# Patient Record
Sex: FEMALE | Race: WHITE | NOT HISPANIC OR LATINO | Employment: FULL TIME | URBAN - NONMETROPOLITAN AREA
[De-identification: names, ages, dates, MRNs, and addresses within clinical notes are randomized per-mention and may not be internally consistent; named-entity substitution may affect disease eponyms.]

---

## 2017-01-10 ENCOUNTER — HOSPITAL ENCOUNTER (OUTPATIENT)
Dept: OTHER | Facility: HOSPITAL | Age: 32
Discharge: HOME | End: 2017-01-10
Attending: ORTHOPAEDIC SURGERY | Admitting: ORTHOPAEDIC SURGERY

## 2017-01-30 RX ORDER — MELOXICAM 15 MG/1
TABLET ORAL
Qty: 30 TABLET | Refills: 0 | Status: SHIPPED | OUTPATIENT
Start: 2017-01-30 | End: 2018-04-20 | Stop reason: HOSPADM

## 2017-02-16 ENCOUNTER — OFFICE VISIT (OUTPATIENT)
Dept: ORTHOPEDIC SURGERY | Facility: CLINIC | Age: 32
End: 2017-02-16

## 2017-02-16 VITALS — BODY MASS INDEX: 25.46 KG/M2 | WEIGHT: 168 LBS | HEIGHT: 68 IN

## 2017-02-16 DIAGNOSIS — M23.91 INTERNAL DERANGEMENT OF KNEE, RIGHT: ICD-10-CM

## 2017-02-16 DIAGNOSIS — M22.41 CHONDROMALACIA PATELLAE, RIGHT: ICD-10-CM

## 2017-02-16 DIAGNOSIS — M25.561 RIGHT KNEE PAIN, UNSPECIFIED CHRONICITY: Primary | ICD-10-CM

## 2017-02-16 PROCEDURE — 99213 OFFICE O/P EST LOW 20 MIN: CPT | Performed by: ORTHOPAEDIC SURGERY

## 2017-02-16 PROCEDURE — 20610 DRAIN/INJ JOINT/BURSA W/O US: CPT | Performed by: ORTHOPAEDIC SURGERY

## 2017-02-16 RX ADMIN — TRIAMCINOLONE ACETONIDE 40 MG: 40 INJECTION, SUSPENSION INTRA-ARTICULAR; INTRAMUSCULAR at 12:02

## 2017-02-16 RX ADMIN — LIDOCAINE HYDROCHLORIDE 2 ML: 10 INJECTION, SOLUTION INFILTRATION; PERINEURAL at 12:02

## 2017-02-16 NOTE — PROGRESS NOTES
"Dayana Ramirez is a 31 y.o. female returns for     Chief Complaint   Patient presents with   • Right Knee - Follow-up, Results       HISTORY OF PRESENT ILLNESS:  Doing some better but still having some trouble with stairs and with walking down a slope.  Giving way.   Pain with pivot and twist.     CONCURRENT MEDICAL HISTORY:    No past medical history on file.    Allergies   Allergen Reactions   • Ceclor [Cefaclor]    • Erythromycin    • Penicillins    • Vancomycin          Current Outpatient Prescriptions:   •  meloxicam (MOBIC) 15 MG tablet, TAKE 1 TABLET BY MOUTH DAILY., Disp: 30 tablet, Rfl: 0    Past Surgical History   Procedure Laterality Date   • Ear tube removal     • Abdominal surgery     • Breast augmentation     •  section         ROS  No fevers or chills.  No chest pain or shortness of air.  No GI or  disturbances.    PHYSICAL EXAMINATION:       Visit Vitals   • Ht 68\" (172.7 cm)   • Wt 168 lb (76.2 kg)   • BMI 25.54 kg/m2       Physical Exam   Musculoskeletal:        Right knee: She exhibits no effusion.       GAIT:     [x]  Normal  []  Antalgic    Assistive device: [x]  None  []  Walker     []  Crutches  []  Cane     []  Wheelchair  []  Stretcher    Right Knee Exam     Tenderness   The patient is experiencing tenderness in the medial joint line.    Range of Motion   The patient has normal right knee ROM.    Muscle Strength     The patient has normal right knee strength.    Tests   Jordan:  Medial - positive   Lachman:  Anterior - negative      Drawer:       Anterior - 1+      Varus: negative  Valgus: negative    Other   Erythema: absent  Sensation: normal  Pulse: present  Swelling: none  Other tests: no effusion present                MRI of the right knee without contrast      HISTORY- Right knee pain      Multisequence multiplanar images of the right knee were obtained  without contrast.      COMPARISON- 2015.      Bone marrow edema or bone bruise patella, slightly more " pronounced  than prior study.  Chondromalacia patella inferior lateral aspect of the patella.  Very small joint effusion.  Cannot completely exclude partial tear of the anterior cruciate  ligament.  The posterior cruciate ligament, collateral ligaments, quadriceps  tendon and patellar tendon are intact.  Degenerative signal change in the menisci.  No meniscal tear.      CONCLUSION-  Bone marrow edema or bone bruise patella, slightly more pronounced  than prior study.  Chondromalacia patella inferior lateral aspect of the patella.  Very small joint effusion.  Cannot completely exclude partial tear of the anterior cruciate  ligament.      [id number]      Electronically Signed By- Kwan Redd MD On: 2017-01-10 11:53:19    Large Joint Arthrocentesis  Date/Time: 2/16/2017 12:02 PM  Consent given by: patient  Site marked: site marked  Timeout: Immediately prior to procedure a time out was called to verify the correct patient, procedure, equipment, support staff and site/side marked as required   Supporting Documentation  Indications: pain   Procedure Details  Location: knee - R knee  Preparation: Patient was prepped and draped in the usual sterile fashion  Needle size: 22 G  Approach: anteromedial  Medications administered: 40 mg triamcinolone acetonide 40 MG/ML; 2 mL lidocaine 1 %  Patient tolerance: patient tolerated the procedure well with no immediate complications              ASSESSMENT:    Diagnoses and all orders for this visit:    Right knee pain, unspecified chronicity  -     Large Joint Arthrocentesis  -     Ambulatory Referral to Physical Therapy Evaluate and treat    Chondromalacia patellae, right  -     Large Joint Arthrocentesis  -     Ambulatory Referral to Physical Therapy Evaluate and treat    Internal derangement of knee, right  -     Large Joint Arthrocentesis  -     Ambulatory Referral to Physical Therapy Evaluate and treat          PLAN    No definitive internal derangement on MRI.  Somewhat symptomatic  with ACL.  Discussed continued use of NSAIDS.  Also discussed PT with HEP to see if strengthening the knee will help alleviate symptoms.  We also discussed KT-1000 test to assess the integrity and function of the acl.  F/u after PT and KT-1000.    Return in about 6 weeks (around 3/30/2017) for recheck.    Paco Perez MD

## 2017-02-19 RX ORDER — LIDOCAINE HYDROCHLORIDE 10 MG/ML
2 INJECTION, SOLUTION INFILTRATION; PERINEURAL
Status: COMPLETED | OUTPATIENT
Start: 2017-02-16 | End: 2017-02-16

## 2017-02-19 RX ORDER — TRIAMCINOLONE ACETONIDE 40 MG/ML
40 INJECTION, SUSPENSION INTRA-ARTICULAR; INTRAMUSCULAR
Status: COMPLETED | OUTPATIENT
Start: 2017-02-16 | End: 2017-02-16

## 2017-02-28 ENCOUNTER — HOSPITAL ENCOUNTER (OUTPATIENT)
Dept: PHYSICAL THERAPY | Facility: HOSPITAL | Age: 32
Setting detail: THERAPIES SERIES
Discharge: HOME | End: 2017-02-28

## 2017-02-28 DIAGNOSIS — M25.561 RIGHT KNEE PAIN, UNSPECIFIED CHRONICITY: Primary | ICD-10-CM

## 2017-02-28 PROCEDURE — 97161 PT EVAL LOW COMPLEX 20 MIN: CPT | Performed by: PHYSICAL THERAPIST

## 2017-02-28 PROCEDURE — 97110 THERAPEUTIC EXERCISES: CPT | Performed by: PHYSICAL THERAPIST

## 2017-03-09 ENCOUNTER — APPOINTMENT (OUTPATIENT)
Dept: PHYSICAL THERAPY | Facility: HOSPITAL | Age: 32
End: 2017-03-09

## 2017-06-26 ENCOUNTER — DOCUMENTATION (OUTPATIENT)
Dept: PHYSICAL THERAPY | Facility: HOSPITAL | Age: 32
End: 2017-06-26

## 2017-09-26 ENCOUNTER — TRANSCRIBE ORDERS (OUTPATIENT)
Dept: PHYSICAL THERAPY | Facility: HOSPITAL | Age: 32
End: 2017-09-26

## 2017-10-02 ENCOUNTER — TRANSCRIBE ORDERS (OUTPATIENT)
Dept: PHYSICAL THERAPY | Facility: HOSPITAL | Age: 32
End: 2017-10-02

## 2017-10-02 ENCOUNTER — HOSPITAL ENCOUNTER (OUTPATIENT)
Dept: PHYSICAL THERAPY | Facility: HOSPITAL | Age: 32
Setting detail: THERAPIES SERIES
Discharge: HOME | End: 2017-10-02

## 2017-10-02 DIAGNOSIS — M25.562 ACUTE PAIN OF LEFT KNEE: Primary | ICD-10-CM

## 2017-10-02 DIAGNOSIS — Z98.890 STATUS POST ARTHROSCOPY OF LEFT KNEE: Primary | ICD-10-CM

## 2017-10-02 PROCEDURE — 97162 PT EVAL MOD COMPLEX 30 MIN: CPT | Performed by: PHYSICAL THERAPIST

## 2017-10-02 PROCEDURE — G0283 ELEC STIM OTHER THAN WOUND: HCPCS | Performed by: PHYSICAL THERAPIST

## 2017-10-02 PROCEDURE — 97110 THERAPEUTIC EXERCISES: CPT | Performed by: PHYSICAL THERAPIST

## 2017-10-03 NOTE — THERAPY EVALUATION
"    Outpatient Physical Therapy Ortho Initial Evaluation  [facility]     Patient Name: Dayana Ramirez  : 1985  MRN: [id number]  Today's Date: 10/2/2017      Visit Date: 10/02/2017  Attendance:  (24 visits)  Subjective Improvement: n/a  Next MD Appt: 1-2 weeks  Recert Date: 10/23/17    Therapy Diagnosis: s/p L knee lateral release 17    Patient Active Problem List   Diagnosis   • Right knee pain   • Chondromalacia patellae   • Internal derangement of knee        History reviewed. No pertinent past medical history.     Past Surgical History:   Procedure Laterality Date   • ABDOMINAL SURGERY     • BREAST AUGMENTATION     •  SECTION     • EAR TUBE REMOVAL     • KNEE ARTHROSCOPY W/ LATERAL RELEASE Left 2017       Visit Dx:     ICD-10-CM ICD-9-CM   1. Status post arthroscopy of left knee Z98.890 V45.89             Patient History       10/02/17 1400          History    Chief Complaint Pain;Difficulty Walking;Difficulty with daily activities  -SS      Type of Pain Knee pain  -SS      Date Current Problem(s) Began 17   chronic before surgery  -SS      Brief Description of Current Complaint Patient underwent L knee arthroscopy with lateral release on 17 for chronic knee pain. Knee hurts when she bends it. Returned to work today. Missed 3 days of work. Has been sitting, but not able to really elevate her leg today since RTW.  female with children. Lives in a single story house with 4 steps to enter.   -SS      Patient/Caregiver Goals Relieve pain   \"Be able to move my leg.\" Decrease swelling.  -SS      Current Tobacco Use no  -SS      Smoking Status none  -SS      Patient's Rating of General Health Excellent  -SS      Occupation/sports/leisure activities [employer] - . Hobbies: sports  -SS      Surgery Date 1 17  -SS      Pain     Pain Location Knee   left  -SS      Pain at Present 4;5  -SS      Pain at Best 3   since " surgery  -SS      Pain at Worst 7;8   since surgery  -SS      Pain Frequency Constant/continuous  -SS      Pain Description Sharp;Tightness   tightness laterally  -SS      What Performance Factors Make the Current Problem(s) WORSE? steps, walking, standing, bending the knee  -SS      What Performance Factors Make the Current Problem(s) BETTER? ice  -SS      Is your sleep disturbed? Yes  -SS      Difficulties at work? pain  -SS      Difficulties with ADL's? decreased activity level  -SS      Difficulties with recreational activities? decreased activity level  -SS      Fall Risk Assessment    Any falls in the past year: No  -SS      Does patient have a fear of falling No  -SS      Daily Activities    Primary Language English  -SS      Safety    Are you being hurt, hit, or frightened by anyone at home or in your life? No  -SS      Are you being neglected by a caregiver No  -SS        User Key  (r) = Recorded By, (t) = Taken By, (c) = Cosigned By    Initials Name Provider Type    SS Kwan Garrett, PT DPT Physical Therapist                PT Ortho       10/02/17 1400    Subjective Comments    Subjective Comments see Therapy Patient History  -SS    Precautions and Contraindications    Precautions/Limitations other (see comments)   limit abduction SLR for at least 3 weeks  -SS    Precautions 5 days post-op  -SS    Contraindications none  -SS    Subjective Pain    Able to rate subjective pain? yes  -SS    Pre-Treatment Pain Level 5  -SS    Post-Treatment Pain Level 0  -SS    Posture/Observations    Observations Edema   L knee  -SS    Posture/Observations Comments Antalgic gait guarding her L knee; no assistive device. Sutured arthroscopy portals medial and lateral to the left patella  -SS    Special Tests/Palpation    Special Tests/Palpation --   Increase tone and TTP lateral L thigh. TTP lateral L knee  -SS    Left Knee    Extension/Flexion AROM Deficit 0-3-57   pain lateral knee and thigh  -SS    Right Knee     Extension/Flexion AROM Deficit 5-0-135  -SS    Lower Extremity    Lower Ext Manual Muscle Testing Detail 4 degree extension lag with SLR on left  -SS      User Key  (r) = Recorded By, (t) = Taken By, (c) = Cosigned By    Initials Name Provider Type     Kwan Garrett, PT DPT Physical Therapist                            Therapy Education       10/02/17 1400          Therapy Education    Given HEP   QS w/ adductor squeeze, SAQ  -SS      Program New  -SS      How Provided Verbal;Demonstration;Written  -SS      Provided to Patient  -      Level of Understanding Verbalized;Demonstrated  -        User Key  (r) = Recorded By, (t) = Taken By, (c) = Cosigned By    Initials Name Provider Type     Kwan Garrett, PT DPT Physical Therapist                PT OP Goals       10/02/17 1400       PT Short Term Goals    STG Date to Achieve 10/23/17  -SS     STG 1 Note a >/= 50% subjective improvement  -     STG 2 L knee active extension to 0 degrees  -     STG 3 L knee active flexion to >/= 100 degrees  -     STG 4 SLR without extension lag  -     STG 5 LEFS score to be >/= 30/80  -     Long Term Goals    LTG Date to Achieve 11/13/17  -SS     LTG 1 Independent with HEP  -SS     LTG 2 L knee active flexion to >/= 130 degrees  -     LTG 3 L rectus femoris, hip, and knee strength to be 5/5  -SS     LTG 4 Minimally antalgic gait.  -     LTG 5 Demo ability to ascend stairs reciprocally  -     LTG 6 LEFS score to be >/= 50/80  -     Time Calculation    PT Goal Re-Cert Due Date 10/23/17  -       User Key  (r) = Recorded By, (t) = Taken By, (c) = Cosigned By    Initials Name Provider Type     Kwan Garrett, PT DPT Physical Therapist                PT Assessment/Plan       10/02/17 1400       PT Assessment    Functional Limitations Impaired gait;Limitation in home management;Performance in leisure activities;Performance in work activities  -     Impairments Pain;Range of motion;Muscle  strength;Gait  -     Assessment Comments Patient is 5 days s/p lateral release of L knee. I recommended that she continue with crutches until her pain decreases and her gait improves.  -     Rehab Potential Excellent  -SS     Patient/caregiver participated in establishment of treatment plan and goals Yes  -SS     Patient would benefit from skilled therapy intervention Yes  -SS     PT Plan    PT Frequency 2x/week;1x/week  -SS     Predicted Duration of Therapy Intervention (days/wks) 4-6 weeks  -SS     Planned CPT's? PT EVAL MOD COMPLEXITY: 31449;PT THER PROC EA 15 MIN: 27941;PT MANUAL THERAPY EA 15 MIN: 32184;PT HOT OR COLD PACK TREAT MCARE;PT ELECTRICAL STIM UNATTEND: ;PT THER SUPP EA 15 MIN  -     Physical Therapy Interventions (Optional Details) gait training;home exercise program;modalities;neuromuscular re-education;patient/family education;ROM (Range of Motion);strengthening;stretching  -     PT Plan Comments ROM, stretching, strengthening, gait training, IFC estim with ice for pain, Russian stim to L quads if quad function not returning after 1 week of P.T.  -       User Key  (r) = Recorded By, (t) = Taken By, (c) = Cosigned By    Initials Name Provider Type     Kwan Garrett, PT DPT Physical Therapist                Modalities       10/02/17 1400          Ice    Ice Applied Yes  -SS      Location L knee    supine with L LE elevated  -SS      Rx Minutes Other:   20 mins; concurrent with IFC  -SS      Ice Prior to Rx No  -SS      Ice S/P Rx Yes  -SS      ELECTRICAL STIMULATION    Attended/Unattended Unattended  -      Stimulation Type IFC  -SS      Location/Electrode Placement/Other L knee  -SS      Rx Minutes 20 mins   concurrent with ice  -SS        User Key  (r) = Recorded By, (t) = Taken By, (c) = Cosigned By    Initials Name Provider Type     Kwan Garrett, PT DPT Physical Therapist              Exercises       10/02/17 1400          Subjective Comments    Subjective  Comments see Therapy Patient History  -SS      Subjective Pain    Able to rate subjective pain? yes  -SS      Pre-Treatment Pain Level 5  -SS      Post-Treatment Pain Level 0  -SS      Aquatics    Aquatics performed? No  -SS      Exercise 1    Exercise Name 1 Pro2, Seat 12, ROM  -SS      Cueing 1 Verbal  -SS      Time (Minutes) 1 8 min  -SS      Exercise 2    Exercise Name 2 Incline calf stretch  -SS      Cueing 2 Verbal;Demo  -SS      Reps 2 3  -SS      Time (Seconds) 2 30 sec hold  -SS      Exercise 3    Exercise Name 3 Standing HS stretch  -SS      Cueing 3 Verbal;Demo  -SS      Reps 3 3  -SS      Time (Seconds) 3 30 sec hold  -SS      Exercise 4    Exercise Name 4 Quad set with isometric adduction  -SS      Cueing 4 Verbal  -SS      Sets 4 1  -SS      Reps 4 10  -SS      Time (Seconds) 4 5 sec hold  -SS      Exercise 5    Exercise Name 5 SAQ  -SS      Cueing 5 Verbal  -SS      Sets 5 1  -SS      Reps 5 10  -SS      Exercise 6    Exercise Name 6 --  -SS        User Key  (r) = Recorded By, (t) = Taken By, (c) = Cosigned By    Initials Name Provider Type    SS Kwan Garrett, PT DPT Physical Therapist                              Outcome Measures       10/02/17 1400          Lower Extremity Functional Index    Any of your usual work, housework or school activities 0  -SS      Your usual hobbies, recreational or sporting activities 0  -SS      Getting into or out of the bath 0  -SS      Walking between rooms 0  -SS      Putting on your shoes or socks 0  -SS      Squatting 0  -SS      Lifting an object, like a bag of groceries from the floor 0  -SS      Performing light activities around your home 0  -SS      Performing heavy activities around your home 0  -SS      Getting into or out of a car 0  -SS      Walking 2 blocks 0  -SS      Walking a mile 0  -SS      Going up or down 10 stairs (about 1 flight of stairs) 0  -SS      Standing for 1 hour 0  -SS      Sitting for 1 hour 0  -SS      Running on even ground  0  -SS      Running on uneven ground 0  -SS      Making sharp turns while running fast 0  -SS      Hopping 0  -SS      Rolling over in bed 0  -SS      Total 0  -SS      Functional Assessment    Outcome Measure Options Lower Extremity Functional Scale (LEFS)  -SS        User Key  (r) = Recorded By, (t) = Taken By, (c) = Cosigned By    Initials Name Provider Type    SS Kwan Garrett, PT DPT Physical Therapist            Time Calculation:   Start Time: 1400  Stop Time: 1514  Time Calculation (min): 74 min     Therapy Charges for Today     Code Description Service Date Service Provider Modifiers Qty    36912897311 HC PT THER PROC EA 15 MIN 10/2/2017 Kwan Garrett, PT DPT GP 2    55160994012 HC PT ELECTRICAL STIM UNATTENDED 10/2/2017 Kwan Garrett, PT DPT  1    11980293961 HC PT THER SUPP EA 15 MIN 10/2/2017 Kwan Garrett, PT DPT GP 1    11427770339 HC PT EVAL MOD COMPLEXITY 2 10/2/2017 Kwan Garrett, PT DPT GP 1                   Kwan Garrett, PT, DPT, CHT  10/2/2017

## 2017-10-06 ENCOUNTER — HOSPITAL ENCOUNTER (OUTPATIENT)
Dept: PHYSICAL THERAPY | Facility: HOSPITAL | Age: 32
Setting detail: THERAPIES SERIES
Discharge: HOME | End: 2017-10-06

## 2017-10-06 DIAGNOSIS — Z98.890 STATUS POST ARTHROSCOPY OF LEFT KNEE: Primary | ICD-10-CM

## 2017-10-06 PROCEDURE — G0283 ELEC STIM OTHER THAN WOUND: HCPCS

## 2017-10-06 PROCEDURE — 97110 THERAPEUTIC EXERCISES: CPT

## 2017-10-06 NOTE — THERAPY TREATMENT NOTE
Outpatient Physical Therapy Ortho Treatment Note  [facility]     Patient Name: Dayana Ramirez  : 1985  MRN: [id number]  Today's Date: 10/6/2017      Visit Date: 10/06/2017     Sub imp 0%  Visit 2/2 (24)  MD 10/10/17  Re 10/23/17    Visit Dx:    ICD-10-CM ICD-9-CM   1. Status post arthroscopy of left knee Z98.890 V45.89       Patient Active Problem List   Diagnosis   • Right knee pain   • Chondromalacia patellae   • Internal derangement of knee        No past medical history on file.     Past Surgical History:   Procedure Laterality Date   • ABDOMINAL SURGERY     • BREAST AUGMENTATION     •  SECTION     • EAR TUBE REMOVAL     • KNEE ARTHROSCOPY W/ LATERAL RELEASE Left 2017             PT Ortho       10/06/17 0800    Left Knee    Extension/Flexion AROM Deficit (P)  0-90  -    Lower Extremity    Lower Ext Manual Muscle Testing Detail (P)  8 degree ext lag with Ind. SLR  -STEPHANY    Gait Assessment/Treatment    Gait, Comment (P)  PWB with 2 crutches  -      User Key  (r) = Recorded By, (t) = Taken By, (c) = Cosigned By    Initials Name Provider Type    STEPHANY Amador ATC                             PT Assessment/Plan       10/06/17 0841       PT Assessment    Assessment Comments (P)  Limited rx to table today secondary to increased swelling. MD note sent with patient. Applied Tubigrip for compression  -     PT Plan    PT Plan Comments (P)  Cont per POC, ROM, Strength, Gait, Edema control  -       User Key  (r) = Recorded By, (t) = Taken By, (c) = Cosigned By    Initials Name Provider Type    STEPHANY Amador ATC                 Modalities       10/06/17 0800          Ice    Ice Applied (P)  Yes  -STEPHANY      Location (P)  L knee    supine with L LE elevated  -STEPHANY      Rx Minutes (P)  Other:   20 mins; concurrent with IFC  -STEPHANY      Ice Prior to Rx (P)  No  -STEPHANY      Ice S/P Rx (P)  Yes  -STEPHANY      ELECTRICAL STIMULATION    Attended/Unattended (P)   Unattended  -STEPHANY      Stimulation Type (P)  IFC  -STEPHANY      Location/Electrode Placement/Other (P)  L knee  -STEPHANY      Rx Minutes (P)  20 mins   concurrent with ice  -STEPHANY        User Key  (r) = Recorded By, (t) = Taken By, (c) = Cosigned By    Initials Name Provider Type    STEPHANY Amador ATC                 Exercises       10/06/17 0800          Subjective Comments    Subjective Comments (P)  Reports increased swelling today. Has been trying to elevate more  -STEPHANY      Subjective Pain    Able to rate subjective pain? (P)  yes  -STEPHANY      Pre-Treatment Pain Level (P)  6  -STEPHANY      Post-Treatment Pain Level (P)  6  -STEPHANY      Aquatics    Aquatics performed? (P)  No  -STEPHANY      Exercise 1    Exercise Name 1 (P)  QS  -STEPHANY      Sets 1 (P)  2  -STEPHANY      Reps 1 (P)  10  -STEPHANY      Exercise 2    Exercise Name 2 (P)  SLR Flex  -STEPHANY      Sets 2 (P)  2  -STEPHANY      Reps 2 (P)  10  -STEPHANY      Exercise 3    Exercise Name 3 (P)  Heel slides w/strap  -STEPHANY      Sets 3 (P)  2  -STEPHANY      Reps 3 (P)  10  -STEPHANY      Exercise 4    Exercise Name 4 (P)  SLR Hip Add  -STEPHANY      Sets 4 (P)  2  -STEPHANY      Reps 4 (P)  10  -STEPHANY      Exercise 5    Exercise Name 5 (P)  SLR Hip Ext  -STEPHANY      Sets 5 (P)  2  -STEPHANY      Reps 5 (P)  10  -STEPHANY      Exercise 6    Exercise Name 6 (P)  SAQ  -STEPHANY      Sets 6 (P)  2  -STEPHANY      Reps 6 (P)  10  -STEPHANY        User Key  (r) = Recorded By, (t) = Taken By, (c) = Cosigned By    Initials Name Provider Type    STEPHANY Amador ATC                                PT OP Goals       10/06/17 0800       PT Short Term Goals    STG Date to Achieve (P)  10/23/17  -STEPHANY     STG 1 (P)  Note a >/= 50% subjective improvement  -STEPHANY     STG 1 Progress (P)  Not Met  -STEPHNAY     STG 2 (P)  L knee active extension to 0 degrees  -STEPHANY     STG 2 Progress (P)  Met  -STEPHANY     STG 3 (P)  L knee active flexion to >/= 100 degrees  -STEPHANY     STG 3 Progress (P)  Not Met  -STEPHANY     STG 4 (P)  SLR without extension lag  -STEPHANY     STG 4 Progress (P)  Not Met  -STEPHANY      STG 5 (P)  LEFS score to be >/= 30/80  -STEPHANY     Long Term Goals    LTG Date to Achieve (P)  11/13/17  -STEPHANY     LTG 1 (P)  Independent with HEP  -STEPHANY     LTG 2 (P)  L knee active flexion to >/= 130 degrees  -STEPHANY     LTG 3 (P)  L rectus femoris, hip, and knee strength to be 5/5  -STEPHANY     LTG 4 (P)  Minimally antalgic gait.  -STEPHANY     LTG 5 (P)  Demo ability to ascend stairs reciprocally  -STEPHANY     LTG 6 (P)  LEFS score to be >/= 50/80  -STEPHANY       User Key  (r) = Recorded By, (t) = Taken By, (c) = Cosigned By    Initials Name Provider Type    STEPHANY Amador ATC                 Therapy Education       10/06/17 0810          Therapy Education    Given (P)  HEP  -STEPHANY      Program (P)  Reinforced  -STEPHANY      How Provided (P)  Verbal  -STEPHANY      Provided to (P)  Patient  -STEPHANY      Level of Understanding (P)  Verbalized  -STEPHANY        User Key  (r) = Recorded By, (t) = Taken By, (c) = Cosigned By    Initials Name Provider Type    STEPHANY Amador ATC                 Time Calculation:   Start Time: (P) 0808  Stop Time: (P) 0902  Time Calculation (min): (P) 54 min  Total Timed Code Minutes- PT: (P) 34 minute(s)    Therapy Charges for Today     Code Description Service Date Service Provider Modifiers Qty    46754275532 HC PT THER SUPP EA 15 MIN 10/6/2017 James Amador ATC  1    71847839166 HC PT ELECTRICAL STIM UNATTENDED 10/6/2017 James Amador ATC  1    09823382269 HC PT THER PROC EA 15 MIN 10/6/2017 James Amador ATC  2                    James Amador ATC  10/6/2017

## 2017-10-10 ENCOUNTER — HOSPITAL ENCOUNTER (OUTPATIENT)
Dept: PHYSICAL THERAPY | Facility: HOSPITAL | Age: 32
Setting detail: THERAPIES SERIES
Discharge: HOME | End: 2017-10-10

## 2017-10-10 DIAGNOSIS — Z98.890 STATUS POST ARTHROSCOPY OF LEFT KNEE: Primary | ICD-10-CM

## 2017-10-10 PROCEDURE — G0283 ELEC STIM OTHER THAN WOUND: HCPCS

## 2017-10-10 PROCEDURE — 97110 THERAPEUTIC EXERCISES: CPT

## 2017-10-10 NOTE — THERAPY TREATMENT NOTE
Outpatient Physical Therapy Ortho Treatment Note  [facility]     Patient Name: Dayana Ramirez  : 1985  MRN: [id number]  Today's Date: 10/10/2017      Visit Date: 10/10/2017  Subjective Improvement: 20%  Visit Number:   3/3  Recert Date:   10/23/17  MD Visit:   2017  Total Approved Visits:  24 for year    Visit Dx:    ICD-10-CM ICD-9-CM   1. Status post arthroscopy of left knee Z98.890 V45.89       Patient Active Problem List   Diagnosis   • Right knee pain   • Chondromalacia patellae   • Internal derangement of knee        No past medical history on file.     Past Surgical History:   Procedure Laterality Date   • ABDOMINAL SURGERY     • BREAST AUGMENTATION     •  SECTION     • EAR TUBE REMOVAL     • KNEE ARTHROSCOPY W/ LATERAL RELEASE Left 2017             PT Ortho       10/10/17 1700    Precautions and Contraindications    Precautions/Limitations --   Limit ABD SLR for at least 3 weeks.  -BB    Contraindications none  -BB    Subjective Pain    Pre-Treatment Pain Level 4  -BB    Posture/Observations    Posture/Observations Comments Mod edema noted.  Sutures removed.   -BB    Special Tests/Palpation    Special Tests/Palpation --   palpable knot lat knee  -BB    General LE Assessment    ROM --   AROM Ext 0, AROM flex 95 deg  -BB    Lower Extremity    Lower Ext Manual Muscle Testing Detail Poor quad activation.   -BB    Gait Assessment/Treatment    Gait, Comment Gait antalgic without crutches and decreased knee flexion.   -BB      User Key  (r) = Recorded By, (t) = Taken By, (c) = Cosigned By    Initials Name Provider Type    BB Tiera Danielle PTA Physical Therapy Assistant                            PT Assessment/Plan       10/10/17 1817       PT Assessment    Assessment Comments Added [place] today per POC due to weak quad.  Encoraged HEP compliance and non antalgic gait.    -BB     PT Plan    PT Frequency 2x/week  -BB     Predicted Duration of Therapy Intervention  (days/wks) 4-6 weeks  -BB     PT Plan Comments Continue with gait, edema control, ROM, Strength and modalities as needed.   -BB       User Key  (r) = Recorded By, (t) = Taken By, (c) = Cosigned By    Initials Name Provider Type    PHAN Danielle PTA Physical Therapy Assistant                Modalities       10/10/17 1700          Ice    Ice Applied Yes  -BB      Location L knee    supine with L LE elevated  -BB      Rx Minutes Other:   20 mins; concurrent with IFC  -BB      Ice Prior to Rx No  -BB      Ice S/P Rx Yes  -BB      ELECTRICAL STIMULATION    Attended/Unattended Unattended  -BB      Stimulation Type IFC  -BB      Location/Electrode Placement/Other L knee  -BB      Rx Minutes 20 mins   concurrent with ice  -BB        User Key  (r) = Recorded By, (t) = Taken By, (c) = Cosigned By    Initials Name Provider Type    PHAN Danielle PTA Physical Therapy Assistant                Exercises       10/10/17 1700          Subjective Comments    Subjective Comments Saw the doctor today and they said everything looked ok and normal.  States they were ok with her going without crutches.   -BB      Subjective Pain    Able to rate subjective pain? yes  -BB      Pre-Treatment Pain Level 4  -BB      Post-Treatment Pain Level 5  -BB      Aquatics    Aquatics performed? No  -BB      Exercise 1    Exercise Name 1 Pro II Rocking  to circles  -BB      Time (Minutes) 1 10  -BB      Exercise 2    Exercise Name 2 incline stretch  -BB      Reps 2 3  -BB      Time (Seconds) 2 30  -BB      Exercise 3    Exercise Name 3 HS stretch  -BB      Reps 3 3  -BB      Time (Seconds) 3 30  -BB      Exercise 4    Exercise Name 4 [place] Estim L VMO with QS  -BB      Time (Minutes) 4 10  -BB      Additional Comments 10/10 with 1.0 ruby  -BB      Exercise 5    Exercise Name 5 Heel slides with strap  -BB      Reps 5 10  -BB      Time (Seconds) 5 5  -BB        User Key  (r) = Recorded By, (t) = Taken By, (c) = Cosigned By    Initials Name  Provider Type    PHAN Danielle PTA Physical Therapy Assistant                               PT OP Goals       10/10/17 1700       PT Short Term Goals    STG Date to Achieve 10/23/17  -BB     STG 1 Note a >/= 50% subjective improvement  -BB     STG 1 Progress Not Met  -BB     STG 2 L knee active extension to 0 degrees  -BB     STG 2 Progress Met  -BB     STG 3 L knee active flexion to >/= 100 degrees  -BB     STG 3 Progress Progressing  -BB     STG 4 SLR without extension lag  -BB     STG 4 Progress Not Met  -BB     STG 5 LEFS score to be >/= 30/80  -BB     STG 5 Progress Not Met  -BB     Long Term Goals    LTG Date to Achieve 11/13/17  -BB     LTG 1 Independent with HEP  -BB     LTG 1 Progress Not Met  -BB     LTG 2 L knee active flexion to >/= 130 degrees  -BB     LTG 2 Progress Not Met  -BB     LTG 3 L rectus femoris, hip, and knee strength to be 5/5  -BB     LTG 3 Progress Not Met  -BB     LTG 4 Minimally antalgic gait.  -BB     LTG 4 Progress Not Met  -BB     LTG 5 Demo ability to ascend stairs reciprocally  -BB     LTG 5 Progress Not Met  -BB     LTG 6 LEFS score to be >/= 50/80  -BB     LTG 6 Progress Not Met  -BB     Time Calculation    PT Goal Re-Cert Due Date 10/23/17  -BB       User Key  (r) = Recorded By, (t) = Taken By, (c) = Cosigned By    Initials Name Provider Type    PHAN Danielle PTA Physical Therapy Assistant                Therapy Education       10/10/17 1814          Therapy Education    Given HEP;Symptoms/condition management;Edema management;Other (comment)   Encouraged to focus on gait without limp and edu on gait with 1 crutch.   -BB      Program New  -BB      How Provided Verbal;Demonstration;Written  -BB      Provided to Patient  -BB      Level of Understanding Verbalized;Demonstrated  -BB        User Key  (r) = Recorded By, (t) = Taken By, (c) = Cosigned By    Initials Name Provider Type    PHAN Danielle PTA Physical Therapy Assistant                Time Calculation:    Start Time: 1735  Stop Time: 1845  Time Calculation (min): 70 min  Total Timed Code Minutes- PT: 70 minute(s)    Therapy Charges for Today     Code Description Service Date Service Provider Modifiers Qty    55208822859 HC PT ELECTRICAL STIM UNATTENDED 10/10/2017 Tiera Danielle, PTA  1    84879176460 HC PT THER PROC EA 15 MIN 10/10/2017 Tiera Danielle PTA GP 3                    Tiera Danielle PTA  10/10/2017

## 2017-10-12 ENCOUNTER — HOSPITAL ENCOUNTER (OUTPATIENT)
Dept: PHYSICAL THERAPY | Facility: HOSPITAL | Age: 32
Setting detail: THERAPIES SERIES
Discharge: HOME | End: 2017-10-12

## 2017-10-12 DIAGNOSIS — Z98.890 STATUS POST ARTHROSCOPY OF LEFT KNEE: Primary | ICD-10-CM

## 2017-10-12 PROCEDURE — 97110 THERAPEUTIC EXERCISES: CPT

## 2017-10-12 PROCEDURE — G0283 ELEC STIM OTHER THAN WOUND: HCPCS

## 2017-10-12 NOTE — THERAPY TREATMENT NOTE
Outpatient Physical Therapy Ortho Treatment Note  [facility]     Patient Name: Dayana Ramirez  : 1985  MRN: [id number]  Today's Date: 10/12/2017      Visit Date: 10/12/2017     Sub imp 20%  Visit / (24)  RE 10/23/17  MD 17    Visit Dx:    ICD-10-CM ICD-9-CM   1. Status post arthroscopy of left knee Z98.890 V45.89       Patient Active Problem List   Diagnosis   • Right knee pain   • Chondromalacia patellae   • Internal derangement of knee        No past medical history on file.     Past Surgical History:   Procedure Laterality Date   • ABDOMINAL SURGERY     • BREAST AUGMENTATION     •  SECTION     • EAR TUBE REMOVAL     • KNEE ARTHROSCOPY W/ LATERAL RELEASE Left 2017             PT Ortho       10/12/17 0900    General LE Assessment    ROM Detail (P)  0-100  -      10/12/17 0800    Posture/Observations    Posture/Observations Comments (P)  Cont mod. edemal  -    Gait Assessment/Treatment    Gait, Comment (P)  PWB with 2 crutches  -      10/10/17 1700    Precautions and Contraindications    Precautions/Limitations --   Limit ABD SLR for at least 3 weeks.  -BB    Contraindications none  -BB    Subjective Pain    Pre-Treatment Pain Level 4  -    Posture/Observations    Posture/Observations Comments Mod edema noted.  Sutures removed.   -BB    Special Tests/Palpation    Special Tests/Palpation --   palpable knot lat knee  -    General LE Assessment    ROM --   AROM Ext 0, AROM flex 95 deg  -BB    Lower Extremity    Lower Ext Manual Muscle Testing Detail Poor quad activation.   -BB    Gait Assessment/Treatment    Gait, Comment Gait antalgic without crutches and decreased knee flexion.   -BB      User Key  (r) = Recorded By, (t) = Taken By, (c) = Cosigned By    Initials Name Provider Type    BB Tiera Danielle, PTA Physical Therapy Assistant    STEPHANY Amador, JOSUÉ                             PT Assessment/Plan       10/12/17 0907       PT Assessment     Assessment Comments (P)  Improved AROM, cont decreased Quad  -STEPHANY     PT Plan    PT Plan Comments (P)  Cont gait, edema, Quad  -STEPHANY       User Key  (r) = Recorded By, (t) = Taken By, (c) = Cosigned By    Initials Name Provider Type    STEPHANY Amador ATC                 Modalities       10/12/17 0900          Ice    Ice Applied (P)  Yes  -STEPHANY      Location (P)  L knee    supine with L LE elevated  -STEPHANY      Rx Minutes (P)  Other:   20 mins; concurrent with IFC  -STEPHANY      Ice Prior to Rx (P)  No  -STEPHANY      Ice S/P Rx (P)  Yes  -STEPHANY      ELECTRICAL STIMULATION    Attended/Unattended (P)  Unattended  -STEPHANY      Stimulation Type (P)  IFC  -STEPHANY      Location/Electrode Placement/Other (P)  L knee  -STEPHANY      Rx Minutes (P)  20 mins   concurrent with ice  -STEPHANY        User Key  (r) = Recorded By, (t) = Taken By, (c) = Cosigned By    Initials Name Provider Type    STEPHANY Amador ATC                 Exercises       10/12/17 0800          Subjective Comments    Subjective Comments (P)  Knee pain is not too bad just uncomfortable. Feels really tight.   -STEPHANY      Subjective Pain    Able to rate subjective pain? (P)  yes  -STEPHANY      Pre-Treatment Pain Level (P)  3  -STEPHANY      Exercise 1    Exercise Name 1 (P)  Pro II seat 11-10  -STEPHANY      Time (Minutes) 1 (P)  10  -STEPHANY      Additional Comments (P)  L1  -STEPHANY      Exercise 2    Exercise Name 2 (P)  Incline stretch  -STEPHANY      Reps 2 (P)  3  -STEPHANY      Time (Minutes) 2 (P)  --  -STEPHANY      Time (Seconds) 2 (P)  30  -STEPHANY      Exercise 3    Exercise Name 3 (P)  Ham stretch  -STEPHANY      Reps 3 (P)  3  -STEPHANY      Time (Seconds) 3 (P)  30  -STEPHANY      Exercise 4    Exercise Name 4 (P)  [place] Estim  -STEPHANY      Time (Minutes) 4 (P)  10  -STEPHANY      Additional Comments (P)  10/10  -STEPHANY      Exercise 5    Exercise Name 5 (P)  SLR flex  -STEPHANY      Sets 5 (P)  2  -STEPHANY      Reps 5 (P)  10  -STEPHANY      Exercise 6    Exercise Name 6 (P)  SAQ  -STEPHANY      Sets 6 (P)  3  -STEPHANY      Reps 6 (P)  10  -STEPHANY      Exercise  7    Exercise Name 7 (P)  LAQ with ADD  -STEPHANY      Sets 7 (P)  3  -STEPHANY      Reps 7 (P)  10  -STEPHANY      Exercise 8    Exercise Name 8 (P)  Heel slides with strap  -STEPHANY      Sets 8 (P)  2  -STEPHANY      Reps 8 (P)  10  -STEPHANY      Exercise 9    Exercise Name 9 (P)  SLR ABD/EXT  -STEPHANY      Sets 9 (P)  3  -STEPHANY      Reps 9 (P)  10  -STEPHANY        User Key  (r) = Recorded By, (t) = Taken By, (c) = Cosigned By    Initials Name Provider Type    STEPHANY Amador, ATC                                PT OP Goals       10/12/17 0900 10/12/17 0800    PT Short Term Goals    STG Date to Achieve (P)  10/23/17  -STEPHANY (P)  10/23/17  -STEPHANY    STG 1 (P)  Note a >/= 50% subjective improvement  -STEPHANY (P)  Note a >/= 50% subjective improvement  -STEPHANY    STG 1 Progress (P)  Not Met  -STEPHANY (P)  Not Met  -STEPHANY    STG 2 (P)  L knee active extension to 0 degrees  -STEPHANY (P)  L knee active extension to 0 degrees  -STEPHANY    STG 2 Progress (P)  Met  -STEPHANY (P)  Met  -STEPHANY    STG 3 (P)  L knee active flexion to >/= 100 degrees  -STEPHANY (P)  L knee active flexion to >/= 100 degrees  -STEPHANY    STG 3 Progress (P)  Met  -STEPHANY (P)  Progressing  -STEPHANY    STG 4 (P)  SLR without extension lag  -STEPHANY (P)  SLR without extension lag  -STEPHANY    STG 4 Progress (P)  Not Met  -STEPHANY (P)  Not Met  -STEPHANY    STG 5 (P)  LEFS score to be >/= 30/80  -STEPHANY (P)  LEFS score to be >/= 30/80  -STEPHANY    STG 5 Progress (P)  Not Met  -STEPHANY (P)  Not Met  -STEPHANY    Long Term Goals    LTG Date to Achieve (P)  11/13/17  -STEPHANY (P)  11/13/17  -STEPHANY    LTG 1 (P)  Independent with HEP  -STEPHANY (P)  Independent with HEP  -STEPHANY    LTG 1 Progress (P)  Not Met  -STEPHANY (P)  Not Met  -STEPHANY    LTG 2 (P)  L knee active flexion to >/= 130 degrees  -STEPHANY (P)  L knee active flexion to >/= 130 degrees  -STEPHANY    LTG 2 Progress (P)  Not Met  -STEPHANY (P)  Not Met  -STEPHANY    LTG 3 (P)  L rectus femoris, hip, and knee strength to be 5/5  -STEPHANY (P)  L rectus femoris, hip, and knee strength to be 5/5  -STEPHANY    LTG 3 Progress (P)  Not Met  -STEPHANY (P)  Not Met  -STEPHANY    LTG 4 (P)  Minimally  antalgic gait.  -STEPHANY (P)  Minimally antalgic gait.  -STEPHANY    LTG 4 Progress (P)  Not Met  -STEPHANY (P)  Not Met  -STEPHANY    LTG 5 (P)  Demo ability to ascend stairs reciprocally  -STEPHANY (P)  Demo ability to ascend stairs reciprocally  -STEPHANY    LTG 5 Progress (P)  Not Met  -STEPHANY (P)  Not Met  -STEPHANY    LTG 6 (P)  LEFS score to be >/= 50/80  -STEPHANY (P)  LEFS score to be >/= 50/80  -STEPHANY    LTG 6 Progress (P)  Not Met  -STEPHANY (P)  Not Met  -STEPHANY      User Key  (r) = Recorded By, (t) = Taken By, (c) = Cosigned By    Initials Name Provider Type    STEPHANY Amador ATC                 Therapy Education       10/12/17 0814          Therapy Education    Given (P)  HEP  -STEPHANY      Program (P)  Reinforced  -STEPHANY      How Provided (P)  Verbal  -STEPHANY      Provided to (P)  Patient  -STEPHANY      Level of Understanding (P)  Verbalized  -STEPHANY        User Key  (r) = Recorded By, (t) = Taken By, (c) = Cosigned By    Initials Name Provider Type    STEPHANY Amador [facility]                 Time Calculation:   Start Time: (P) 0810  Stop Time: (P) 0928  Time Calculation (min): (P) 78 min  Total Timed Code Minutes- PT: (P) 58 minute(s)    Therapy Charges for Today     Code Description Service Date Service Provider Modifiers Qty    40826508395 HC PT THER SUPP EA 15 MIN 10/12/2017 James Amador ATC  1    14119788634 HC PT ELECTRICAL STIM UNATTENDED 10/12/2017 James Amador ATC  1    77549549126 HC PT THER PROC EA 15 MIN 10/12/2017 James Amador ATC  4                    James Amador ATC  10/12/2017

## 2017-10-16 ENCOUNTER — HOSPITAL ENCOUNTER (OUTPATIENT)
Dept: PHYSICAL THERAPY | Facility: HOSPITAL | Age: 32
Setting detail: THERAPIES SERIES
Discharge: HOME | End: 2017-10-16

## 2017-10-16 DIAGNOSIS — Z98.890 STATUS POST ARTHROSCOPY OF LEFT KNEE: Primary | ICD-10-CM

## 2017-10-16 PROCEDURE — 97110 THERAPEUTIC EXERCISES: CPT

## 2017-10-16 PROCEDURE — G0283 ELEC STIM OTHER THAN WOUND: HCPCS

## 2017-10-16 NOTE — THERAPY TREATMENT NOTE
Outpatient Physical Therapy Ortho Treatment Note  [facility]     Patient Name: Dayana Ramirez  : 1985  MRN: [id number]  Today's Date: 10/16/2017      Visit Date: 10/16/2017     Sub imp 60^  Visit  (24)  MD 17  RE 10/23/17    Visit Dx:    ICD-10-CM ICD-9-CM   1. Status post arthroscopy of left knee Z98.890 V45.89       Patient Active Problem List   Diagnosis   • Right knee pain   • Chondromalacia patellae   • Internal derangement of knee        No past medical history on file.     Past Surgical History:   Procedure Laterality Date   • ABDOMINAL SURGERY     • BREAST AUGMENTATION     •  SECTION     • EAR TUBE REMOVAL     • KNEE ARTHROSCOPY W/ LATERAL RELEASE Left 2017             PT Ortho       10/16/17 0800    Posture/Observations    Posture/Observations Comments (P)  Cont edema  -STEPHANY    General LE Assessment    ROM Detail (P)  0-105  -STEPHANY    MMT (Manual Muscle Testing)    General MMT Assessment Detail (P)  Ind SLR with 10 degree lag  -STEPHANY    Gait Assessment/Treatment    Gait, Comment (P)  FWB antalgic  -STEPHANY      User Key  (r) = Recorded By, (t) = Taken By, (c) = Cosigned By    Initials Name Provider Type    STEPHANY Amador ATC                             PT Assessment/Plan       10/16/17 0904       PT Assessment    Assessment Comments (P)  improved quad control, ROM,   -STEPHANY     PT Plan    PT Plan Comments (P)  Cont with Quad, Gait, ROM, Edema  -STEPHANY       User Key  (r) = Recorded By, (t) = Taken By, (c) = Cosigned By    Initials Name Provider Type    STEPHANY Amador ATC                     Exercises       10/16/17 0800          Subjective Comments    Subjective Comments (P)  NO pain at present. Stiff  -STEPHANY      Subjective Pain    Able to rate subjective pain? (P)  yes  -STEPHANY      Pre-Treatment Pain Level (P)  0  -STEPHANY      Post-Treatment Pain Level (P)  0  -STEPHANY      Exercise 1    Exercise Name 1 (P)  Pro II seat 10  -STEPHANY      Time (Minutes) 1 (P)  10   -STEPHANY      Additional Comments (P)  L1  -STEPHANY      Exercise 2    Exercise Name 2 (P)  Incline stretch  -STEPHANY      Reps 2 (P)  3  -STEPHANY      Time (Seconds) 2 (P)  30  -STEPHANY      Exercise 3    Exercise Name 3 (P)  Ham stretch  -STEPHANY      Reps 3 (P)  3  -STEPHANY      Time (Seconds) 3 (P)  30  -STEPHANY      Exercise 4    Exercise Name 4 (P)  [place] Estim  -STEPHANY      Time (Minutes) 4 (P)  10  -STEPHANY      Additional Comments (P)  10/10  -STEPHANY      Exercise 5    Exercise Name 5 (P)  SLR flex  -STEPHANY      Sets 5 (P)  2  -STEPHANY      Reps 5 (P)  10  -STEPHANY      Exercise 6    Exercise Name 6 (P)  SAQ  -STEPHANY      Sets 6 (P)  3  -STEPHANY      Reps 6 (P)  10  -STEPHANY      Exercise 7    Exercise Name 7 (P)  LAQ with ADD  -STEPHANY      Sets 7 (P)  3  -STEPHANY      Reps 7 (P)  10  -STEPHANY      Exercise 8    Exercise Name 8 (P)  Heel slides with strap  -STEPHANY      Sets 8 (P)  2  -STEPHANY      Reps 8 (P)  10  -STEPHANY      Exercise 9    Exercise Name 9 (P)  SLR ABD/EXT  -STEPHANY      Sets 9 (P)  3  -STEPHANY      Reps 9 (P)  10  -STEPHANY      Exercise 10    Exercise Name 10 (P)  Prone TKE  -STEPHANY      Sets 10 (P)  3  -STEPHANY      Reps 10 (P)  10  -STEPHANY      Exercise 11    Exercise Name 11 (P)  Standing TKE  -STEPHANY      Sets 11 (P)  2  -STEPHANY      Reps 11 (P)  10  -STEPHANY      Additional Comments (P)  red  -STEPHANY        User Key  (r) = Recorded By, (t) = Taken By, (c) = Cosigned By    Initials Name Provider Type    STEPHANY Amador ATC                                PT OP Goals       10/16/17 0800       PT Short Term Goals    STG Date to Achieve (P)  10/23/17  -STEPHANY     STG 1 (P)  Note a >/= 50% subjective improvement  -STEPHANY     STG 1 Progress (P)  Not Met  -STEPHANY     STG 2 (P)  L knee active extension to 0 degrees  -STEPHANY     STG 2 Progress (P)  Met  -STEPHANY     STG 3 (P)  L knee active flexion to >/= 100 degrees  -STEPHANY     STG 3 Progress (P)  Progressing  -STEPHANY     STG 4 (P)  SLR without extension lag  -STEPHANY     STG 4 Progress (P)  Not Met  -STEPHANY     STG 5 (P)  LEFS score to be >/= 30/80  -STEPHANY     STG 5 Progress (P)  Not Met  -     Long Term Goals     LTG Date to Achieve (P)  11/13/17  -STEPHANY     LTG 1 (P)  Independent with HEP  -STEPHANY     LTG 1 Progress (P)  Not Met  -STEPHANY     LTG 2 (P)  L knee active flexion to >/= 130 degrees  -STEPHANY     LTG 2 Progress (P)  Not Met  -STEPHANY     LTG 3 (P)  L rectus femoris, hip, and knee strength to be 5/5  -STEPHANY     LTG 3 Progress (P)  Not Met  -STEPHANY     LTG 4 (P)  Minimally antalgic gait.  -STEPHANY     LTG 4 Progress (P)  Not Met  -STEPHANY     LTG 5 (P)  Demo ability to ascend stairs reciprocally  -STEPHANY     LTG 5 Progress (P)  Not Met  -STEPHANY     LTG 6 (P)  LEFS score to be >/= 50/80  -STEPHANY     LTG 6 Progress (P)  Not Met  -STEPHANY       User Key  (r) = Recorded By, (t) = Taken By, (c) = Cosigned By    Initials Name Provider Type    STEPHANY Amador ATC                 Therapy Education       10/16/17 0811          Therapy Education    Given (P)  HEP  -STEPHANY      Program (P)  Reinforced  -STEPHANY      How Provided (P)  Verbal  -STEPHANY      Provided to (P)  Patient  -STEPHANY      Level of Understanding (P)  Verbalized  -STEPHANY        User Key  (r) = Recorded By, (t) = Taken By, (c) = Cosigned By    Initials Name Provider Type    STEPHANY Amador ATC                 Time Calculation:   Start Time: (P) 0805  Stop Time: (P) 0930  Time Calculation (min): (P) 85 min  Total Timed Code Minutes- PT: (P) 65 minute(s)    Therapy Charges for Today     Code Description Service Date Service Provider Modifiers Qty    86150354613 HC PT THER SUPP EA 15 MIN 10/16/2017 James Amador ATC  1    80237079679 HC PT ELECTRICAL STIM UNATTENDED 10/16/2017 James Amador ATC  1    98823440617 HC PT THER PROC EA 15 MIN 10/16/2017 James Amador ATC  4                    James Amador ATC  10/16/2017

## 2017-10-18 ENCOUNTER — HOSPITAL ENCOUNTER (OUTPATIENT)
Dept: PHYSICAL THERAPY | Facility: HOSPITAL | Age: 32
Setting detail: THERAPIES SERIES
Discharge: HOME | End: 2017-10-18

## 2017-10-18 DIAGNOSIS — M25.561 RIGHT KNEE PAIN, UNSPECIFIED CHRONICITY: ICD-10-CM

## 2017-10-18 DIAGNOSIS — Z98.890 STATUS POST ARTHROSCOPY OF LEFT KNEE: Primary | ICD-10-CM

## 2017-10-18 PROCEDURE — G0283 ELEC STIM OTHER THAN WOUND: HCPCS

## 2017-10-18 PROCEDURE — 97110 THERAPEUTIC EXERCISES: CPT

## 2017-10-18 NOTE — THERAPY TREATMENT NOTE
Outpatient Physical Therapy Ortho Treatment Note  [facility]     Patient Name: Dayana Ramirez  : 1985  MRN: [id number]  Today's Date: 10/18/2017      Visit Date: 10/18/2017     Subjective Improvement: 60%  Attendance:   (24/yr)  Next MD Visit : 17  Recert Date:  10/23/17      Therapy Diagnosis:  s/p L knee lateral release 17        Visit Dx:    ICD-10-CM ICD-9-CM   1. Status post arthroscopy of left knee Z98.890 V45.89   2. Right knee pain, unspecified chronicity M25.561 719.46       Patient Active Problem List   Diagnosis   • Right knee pain   • Chondromalacia patellae   • Internal derangement of knee        No past medical history on file.     Past Surgical History:   Procedure Laterality Date   • ABDOMINAL SURGERY     • BREAST AUGMENTATION     •  SECTION     • EAR TUBE REMOVAL     • KNEE ARTHROSCOPY W/ LATERAL RELEASE Left 2017             PT Ortho       10/18/17 0804    Subjective Comments    Subjective Comments pt reports that her knee is feeling a lot better.  Reports that the swelling is going down and feels the quad is getting stronger.   -    Subjective Pain    Post-Treatment Pain Level 0  -    Posture/Observations    Posture/Observations Comments antalgic gait; mild edema  -    General LE Assessment    ROM Detail AROM 0-118  -      10/16/17 0800    Posture/Observations    Posture/Observations Comments (P)  Cont edema  -    General LE Assessment    ROM Detail (P)  0-105  -    MMT (Manual Muscle Testing)    General MMT Assessment Detail (P)  Ind SLR with 10 degree lag  -    Gait Assessment/Treatment    Gait, Comment (P)  FWB antalgic  -      User Key  (r) = Recorded By, (t) = Taken By, (c) = Cosigned By    Initials Name Provider Type    KEYUR Donahue, PTA Physical Therapy Assistant    STEPHANY Amador, ATC                             PT Assessment/Plan       10/18/17 0804       PT Assessment    Assessment Comments improved ROM  "this date. SLR improving independently.  Some discomfort with squats but tolerable.   -KH     PT Plan    PT Frequency 2x/week  -KH     Predicted Duration of Therapy Intervention (days/wks) 4-6 weeks  -KH     PT Plan Comments Continue with current POC; possible d/c of [place] next visit.  Progress WB activities as able.   -KH       User Key  (r) = Recorded By, (t) = Taken By, (c) = Cosigned By    Initials Name Provider Type    KEYUR Donahue PTA Physical Therapy Assistant                Modalities       10/18/17 0804          Ice    Ice Applied Yes  -KH      Location L knee    supine with L LE elevated  -KH      Rx Minutes Other:   20 mins; concurrent with IFC  -KH      Ice Prior to Rx No  -KH      Ice S/P Rx Yes  -KH      ELECTRICAL STIMULATION    Attended/Unattended Unattended  -      Stimulation Type IFC  -KH      Location/Electrode Placement/Other L knee  -KH      Rx Minutes 20 mins   concurrent with ice  -KH        User Key  (r) = Recorded By, (t) = Taken By, (c) = Cosigned By    Initials Name Provider Type    KEYUR Donahue PTA Physical Therapy Assistant                Exercises       10/18/17 0804          Subjective Comments    Subjective Comments pt reports that her knee is feeling a lot better.  Reports that the swelling is going down and feels the quad is getting stronger.   -KH      Subjective Pain    Able to rate subjective pain? yes  -KH      Pre-Treatment Pain Level 2  -KH      Post-Treatment Pain Level 0  -KH      Exercise 1    Exercise Name 1 pro ll LE strength  -KH      Time (Minutes) 1 10'  -KH      Additional Comments L1  -KH      Exercise 2    Exercise Name 2 incline stretch  -KH      Sets 2 3  -KH      Time (Seconds) 2 30\"  -KH      Exercise 3    Exercise Name 3 hamstring stretch  -KH      Sets 3 3  -KH      Time (Seconds) 3 30\"  -KH      Exercise 4    Exercise Name 4 [place] citizen Estim   -KH      Time (Minutes) 4 10'  -KH      Time (Seconds) 4 10/10  -KH      Additional Comments w/ quad sets & SLR "  -KH      Exercise 5    Exercise Name 5 SAQ lg bolster  -KH      Sets 5 3  -KH      Reps 5 10  -KH      Exercise 6    Exercise Name 6 Prone TKE's  -KH      Sets 6 3  -KH      Reps 6 10  -KH      Exercise 7    Exercise Name 7 CR/TR  -KH      Sets 7 2  -KH      Reps 7 10  -KH      Exercise 8    Exercise Name 8 Mini Squats  -KH      Sets 8 2  -KH      Reps 8 10  -KH      Exercise 9    Exercise Name 9 Heel Slides w/ strap  -KH      Reps 9 20  -KH        User Key  (r) = Recorded By, (t) = Taken By, (c) = Cosigned By    Initials Name Provider Type    KEYUR Donahue PTA Physical Therapy Assistant                               PT OP Goals       10/18/17 0804       PT Short Term Goals    STG Date to Achieve 10/23/17  -     STG 1 Note a >/= 50% subjective improvement  -     STG 1 Progress Not Met  -     STG 2 L knee active extension to 0 degrees  -     STG 2 Progress Met  -     STG 3 L knee active flexion to >/= 100 degrees  -     STG 3 Progress Met  -     STG 4 SLR without extension lag  -     STG 4 Progress Met  -     STG 5 LEFS score to be >/= 30/80  -     STG 5 Progress Not Met  -     Long Term Goals    LTG Date to Achieve 11/13/17  -     LTG 1 Independent with HEP  -     LTG 1 Progress Not Met  -     LTG 2 L knee active flexion to >/= 130 degrees  -     LTG 2 Progress Not Met  -     LTG 3 L rectus femoris, hip, and knee strength to be 5/5  -KH     LTG 3 Progress Not Met  -     LTG 4 Minimally antalgic gait.  -     LTG 4 Progress Not Met  -KH     LTG 5 Demo ability to ascend stairs reciprocally  -     LTG 5 Progress Not Met  -     LTG 6 LEFS score to be >/= 50/80  -     LTG 6 Progress Not Met  -KH     Time Calculation    PT Goal Re-Cert Due Date 10/23/17  -       User Key  (r) = Recorded By, (t) = Taken By, (c) = Cosigned By    Initials Name Provider Type    KEYUR Donahue PTA Physical Therapy Assistant                    Time Calculation:   Start Time: 0804  Stop Time:  0915  Time Calculation (min): 71 min  Total Timed Code Minutes- PT: 51 minute(s)    Therapy Charges for Today     Code Description Service Date Service Provider Modifiers Qty    95960860252 HC PT THER SUPP EA 15 MIN 10/18/2017 Wilda Donahue PTA GP 1    13764690269 HC PT ELECTRICAL STIM UNATTENDED 10/18/2017 Wilda Donahue PTA  1    56033793955 HC PT THER PROC EA 15 MIN 10/18/2017 Wilda Donahue PTA GP 3                    Wilda Donahue PTA  10/18/2017

## 2017-10-24 ENCOUNTER — HOSPITAL ENCOUNTER (OUTPATIENT)
Dept: PHYSICAL THERAPY | Facility: HOSPITAL | Age: 32
Setting detail: THERAPIES SERIES
Discharge: HOME | End: 2017-10-24

## 2017-10-24 DIAGNOSIS — Z98.890 STATUS POST ARTHROSCOPY OF LEFT KNEE: Primary | ICD-10-CM

## 2017-10-24 PROCEDURE — G0283 ELEC STIM OTHER THAN WOUND: HCPCS

## 2017-10-24 PROCEDURE — 97110 THERAPEUTIC EXERCISES: CPT

## 2017-10-24 NOTE — THERAPY TREATMENT NOTE
Outpatient Physical Therapy Ortho Treatment Note  [facility]     Patient Name: Dayana Ramirez  : 1985  MRN: [id number]  Today's Date: 10/24/2017      Visit Date: 10/24/2017     Sub imp 80%  Visit  ()  MD 17  Re next    Visit Dx:    ICD-10-CM ICD-9-CM   1. Status post arthroscopy of left knee Z98.890 V45.89       Patient Active Problem List   Diagnosis   • Right knee pain   • Chondromalacia patellae   • Internal derangement of knee        No past medical history on file.     Past Surgical History:   Procedure Laterality Date   • ABDOMINAL SURGERY     • BREAST AUGMENTATION     •  SECTION     • EAR TUBE REMOVAL     • KNEE ARTHROSCOPY W/ LATERAL RELEASE Left 2017             PT Ortho       10/24/17 0800    Posture/Observations    Posture/Observations Comments (P)  min antalgic  -STEPHANY    General LE Assessment    ROM Detail (P)  AROM 0-128  -STEPHANY    MMT (Manual Muscle Testing)    General MMT Assessment Detail (P)  Ind SLR with mini. lag  -STEPHANY      User Key  (r) = Recorded By, (t) = Taken By, (c) = Cosigned By    Initials Name Provider Type    STEPHANY Amador [employer]                             PT Assessment/Plan       10/24/17 0855       PT Assessment    Assessment Comments (P)  Improved ROM and Quad  -STEPHANY     PT Plan    PT Plan Comments (P)  Recert next  -STEPHANY       User Key  (r) = Recorded By, (t) = Taken By, (c) = Cosigned By    Initials Name Provider Type    STEPHANY Amador [employer]                 Modalities       10/24/17 0800          Ice    Ice Applied (P)  Yes  -STEPHANY      Location (P)  L knee    supine with L LE elevated  -STEPHANY      Rx Minutes (P)  Other:   20 mins; concurrent with IFC  -STEPHANY      Ice Prior to Rx (P)  No  -STEPHANY      Ice S/P Rx (P)  Yes  -STEPHANY      ELECTRICAL STIMULATION    Attended/Unattended (P)  Unattended  -STEPHANY      Stimulation Type (P)  IFC  -STEPHANY      Location/Electrode Placement/Other (P)  L knee  -STEPHANY      Rx Minutes (P)  20 mins  "  concurrent with ice  -STEPHANY        User Key  (r) = Recorded By, (t) = Taken By, (c) = Cosigned By    Initials Name Provider Type    STEPHANY Amador ATC                 Exercises       10/24/17 0800          Subjective Comments    Subjective Comments (P)  Doing well. no pain today. decreased swelling  -STEPHANY      Subjective Pain    Able to rate subjective pain? (P)  yes  -STEPHANY      Pre-Treatment Pain Level (P)  0  -STEPHANY      Post-Treatment Pain Level (P)  0  -STEPHANY      Exercise 1    Exercise Name 1 (P)  pro ll LE strength  -STEPHANY      Time (Minutes) 1 (P)  10'  -STEPHANY      Exercise 2    Exercise Name 2 (P)  incline stretch  -STEPHANY      Sets 2 (P)  3  -STEPHANY      Time (Seconds) 2 (P)  30\"  -STEPHANY      Exercise 3    Exercise Name 3 (P)  hamstring stretch  -STEPHANY      Sets 3 (P)  3  -STEPHANY      Time (Seconds) 3 (P)  30\"  -STEPHANY      Exercise 4    Exercise Name 4 (P)  --  -STEPHANY      Time (Minutes) 4 (P)  --  -STEPHANY      Time (Seconds) 4 (P)  --  -STEPHANY      Exercise 5    Exercise Name 5 (P)  SAQ lg bolster  -STEPHANY      Sets 5 (P)  3  -STEPHANY      Reps 5 (P)  10  -STEPHANY      Exercise 6    Exercise Name 6 (P)  Prone TKE's  -STEPHANY      Sets 6 (P)  3  -STEPHANY      Reps 6 (P)  10  -STEPHANY      Exercise 7    Exercise Name 7 (P)   A[employer] CR/TR  -STEPHANY      Sets 7 (P)  3  -STEPHANY      Reps 7 (P)  10  -STEPHANY      Exercise 8    Exercise Name 8 (P)   [employer] Mini Squats  -STEPHANY      Sets 8 (P)  3  -STEPHANY      Reps 8 (P)  10  -STEPHANY      Exercise 9    Exercise Name 9 (P)  SLR flex  -STEPHANY      Reps 9 (P)  20  -STEPHANY      Exercise 10    Exercise Name 10 (P)  A[employer]x SLS  -STEPHANY      Reps 10 (P)  10  -STEPHANY      Time (Seconds) 10 (P)  10  -STEPHANY      Exercise 11    Exercise Name 11 (P)  Cybex ABD/ADD  -STEPHANY      Sets 11 (P)  2  -STEPHANY      Reps 11 (P)  10  -STEPHANY      Additional Comments (P)  55  -STEPHANY      Exercise 12    Exercise Name 12 (P)  Leg press  -STEPHANY      Sets 12 (P)  2  -STEPHANY      Reps 12 (P)  10  -STEPHANY        User Key  (r) = Recorded By, (t) = Taken By, (c) = Cosigned By    Initials Name Provider Type    STEPHANY Amador, " [facility]                                PT OP Goals       10/24/17 0800       PT Short Term Goals    STG Date to Achieve (P)  10/23/17  -STEPHANY     STG 1 (P)  Note a >/= 50% subjective improvement  -STEPHANY     STG 1 Progress (P)  Met  -STEPHANY     STG 2 (P)  L knee active extension to 0 degrees  -STEPHANY     STG 2 Progress (P)  Met  -STEPHANY     STG 3 (P)  L knee active flexion to >/= 100 degrees  -STEPHANY     STG 3 Progress (P)  Met  -STEPHANY     STG 4 (P)  SLR without extension lag  -STEPHANY     STG 4 Progress (P)  Met  -STEPHANY     STG 5 (P)  LEFS score to be >/= 30/80  -SETPHANY     STG 5 Progress (P)  Not Met  -STEPHANY     Long Term Goals    LTG Date to Achieve (P)  11/13/17  -STEPHANY     LTG 1 (P)  Independent with HEP  -STEPHANY     LTG 1 Progress (P)  Ongoing  -STEPHANY     LTG 2 (P)  L knee active flexion to >/= 130 degrees  -STEPHANY     LTG 2 Progress (P)  Progressing  -STEPHANY     LTG 3 (P)  L rectus femoris, hip, and knee strength to be 5/5  -STEPHANY     LTG 3 Progress (P)  Not Met  -STEPHANY     LTG 4 (P)  Minimally antalgic gait.  -STEPHANY     LTG 4 Progress (P)  --  -STEPHANY     LTG 5 (P)  Demo ability to ascend stairs reciprocally  -STEPHANY     LTG 5 Progress (P)  Progressing  -STEPHANY     LTG 6 (P)  LEFS score to be >/= 50/80  -STEPHANY     LTG 6 Progress (P)  Not Met  -STEPHANY       User Key  (r) = Recorded By, (t) = Taken By, (c) = Cosigned By    Initials Name Provider Type    STEPHANY Amador [facility]                 Therapy Education       10/24/17 0817          Therapy Education    Given (P)  HEP  -STEPHANY      Program (P)  Reinforced  -STEPHANY      How Provided (P)  Verbal  -STEPHANY      Provided to (P)  Patient  -STEPHANY      Level of Understanding (P)  Verbalized  -STEPHANY        User Key  (r) = Recorded By, (t) = Taken By, (c) = Cosigned By    Initials Name Provider Type    STEPHANY Amador [facility]                 Time Calculation:   Start Time: (P) 0806  Stop Time: (P) 0913  Time Calculation (min): (P) 67 min  Total Timed Code Minutes- PT: (P) 47 minute(s)    Therapy Charges for Today     Code  Description Service Date Service Provider Modifiers Qty    75227526276 HC PT THER SUPP EA 15 MIN 10/24/2017 James Amador, ATC  1    10177775205 HC PT ELECTRICAL STIM UNATTENDED 10/24/2017 James Amador, ATC  1    18726846633 HC PT THER PROC EA 15 MIN 10/24/2017 James Amador, ATC  3                    James Amador, ATC  10/24/2017

## 2017-10-26 ENCOUNTER — APPOINTMENT (OUTPATIENT)
Dept: PHYSICAL THERAPY | Facility: HOSPITAL | Age: 32
End: 2017-10-26

## 2017-10-26 ENCOUNTER — HOSPITAL ENCOUNTER (OUTPATIENT)
Dept: PHYSICAL THERAPY | Facility: HOSPITAL | Age: 32
Setting detail: THERAPIES SERIES
Discharge: HOME | End: 2017-10-26

## 2017-10-26 DIAGNOSIS — Z98.890 STATUS POST ARTHROSCOPY OF LEFT KNEE: Primary | ICD-10-CM

## 2017-10-26 DIAGNOSIS — M25.561 RIGHT KNEE PAIN, UNSPECIFIED CHRONICITY: ICD-10-CM

## 2017-10-26 PROCEDURE — 97535 SELF CARE MNGMENT TRAINING: CPT | Performed by: PHYSICAL THERAPIST

## 2017-10-27 NOTE — THERAPY DISCHARGE NOTE
"     Outpatient Physical Therapy Ortho Progress Note/Discharge Summary  [facility]     Patient Name: Dayana Ramirez  : 1985  MRN: [id number]  Today's Date: 10/26/2017      Visit Date: 10/26/2017  Attendance:  (24 visits)  Subjective Improvement: 75%  Next MD Appt: 17    Therapy Diagnosis: L knee lateral release 17    Visit Dx:    ICD-10-CM ICD-9-CM   1. Status post arthroscopy of left knee Z98.890 V45.89   2. Right knee pain, unspecified chronicity M25.561 719.46       Patient Active Problem List   Diagnosis   • Right knee pain   • Chondromalacia patellae   • Internal derangement of knee        History reviewed. No pertinent past medical history.     Past Surgical History:   Procedure Laterality Date   • ABDOMINAL SURGERY     • BREAST AUGMENTATION     •  SECTION     • EAR TUBE REMOVAL     • KNEE ARTHROSCOPY W/ LATERAL RELEASE Left 2017     Changes in Medications: none noted  Changes in MD Orders: none noted  Number of Work Days Lost: 3 days            PT Ortho       10/26/17 1600    Subjective Comments    Subjective Comments \"Good. It's a little stiff feeling at times. I can get in and out of my car.\" Stiff when wakes up in the mornings and when it's cold. MD next week. No medication changes. Still difficult to squat. 75% subjective improvement. Working on HEP consistently. Patient needs to get to [facility] by 1700 hrs for a basketball game.  -SS    Precautions and Contraindications    Precautions/Limitations no known precautions/limitations  -SS    Precautions none  -SS    Contraindications none  -SS    Subjective Pain    Able to rate subjective pain? yes  -SS    Pre-Treatment Pain Level 0  -SS    Special Tests/Palpation    Special Tests/Palpation --   TTP lateral knee superiorly  -SS    Left Knee    Extension/Flexion AROM Deficit 0-132  -SS    Left Hip    Hip Flexion Gross Movement (5/5) normal  -SS    Hip Extension Gross Movement (5/5) normal  -SS    Hip ABduction Gross " "Movement (5/5) normal  -SS    Hip ADduction Gross Movement (5/5) normal  -SS    Lower Extremity    Lower Ext Manual Muscle Testing Detail 5 degree extension lag with SLR.. L rectus femoris 4+/5  -SS    Left Knee    Knee Extension Gross Movement (5/5) normal  -SS    Knee Flexion Gross Movement (5/5) normal  -SS    Lower Extremity Flexibility    Quadriceps --   Quad angle: R 127 deg, L 125 deg with lateral knee pain  -      10/24/17 0800    Posture/Observations    Posture/Observations Comments (P)  min antalgic  -    General LE Assessment    ROM Detail (P)  AROM 0-128  -STEPHANY    MMT (Manual Muscle Testing)    General MMT Assessment Detail (P)  Ind SLR with mini. lag  -      User Key  (r) = Recorded By, (t) = Taken By, (c) = Cosigned By    Initials Name Provider Type     Kwan Garrett, PT DPT Physical Therapist    STEPHANY Amador, ATC                             PT Assessment/Plan       10/26/17 1600       PT Assessment    Functional Limitations Performance in leisure activities  -     Impairments Pain;Muscle strength  -     Assessment Comments Patient has lateral knee pain with quadriceps angle testing and prone quad stretching. I instructed patient to perform SLR  reps every 1-2 days and prone quad stretching for HEP. She was dispensed a 1 month free membership to [employer].  -     Rehab Potential Excellent  -     Patient/caregiver participated in establishment of treatment plan and goals Yes  -     Patient would benefit from skilled therapy intervention Yes  -     PT Plan    PT Frequency Other (comment)   D/C P.T.  -     PT Plan Comments D/C P.T.  -       User Key  (r) = Recorded By, (t) = Taken By, (c) = Cosigned By    Initials Name Provider Type     Kwan Garrett, PT DPT Physical Therapist                    Exercises       10/26/17 1600          Subjective Comments    Subjective Comments \"Good. It's a little stiff feeling at times. I can get in " "and out of my car.\" Stiff when wakes up in the mornings and when it's cold. MD next week. No medication changes. Still difficult to squat. 75% subjective improvement. Working on HEP consistently. Patient needs to get to [facility] by 1700 hrs for a basketball game.  -SS      Subjective Pain    Able to rate subjective pain? yes  -SS      Pre-Treatment Pain Level 0  -SS      Exercise 1    Exercise Name 1 review HEP  -SS      Exercise 2    Exercise Name 2 Prone quad stretch  -SS      Additional Comments HEP  -SS        User Key  (r) = Recorded By, (t) = Taken By, (c) = Cosigned By    Initials Name Provider Type    ARELIS Garrett, PT DPT Physical Therapist                               PT OP Goals       10/26/17 1600       PT Short Term Goals    STG Date to Achieve 10/23/17  -SS     STG 1 Note a >/= 50% subjective improvement  -SS     STG 1 Progress Met  -SS     STG 2 L knee active extension to 0 degrees  -SS     STG 2 Progress Met  -SS     STG 3 L knee active flexion to >/= 100 degrees  -SS     STG 3 Progress Met  -SS     STG 4 SLR without extension lag  -SS     STG 4 Progress Met  -SS     STG 5 LEFS score to be >/= 30/80  -SS     STG 5 Progress Met  -SS     Long Term Goals    LTG Date to Achieve 11/13/17  -SS     LTG 1 Independent with HEP  -SS     LTG 1 Progress Met  -SS     LTG 2 L knee active flexion to >/= 130 degrees  -SS     LTG 2 Progress Met  -SS     LTG 3 L rectus femoris, hip, and knee strength to be 5/5  -SS     LTG 3 Progress Not Met  -SS     LTG 4 Minimally antalgic gait.  -SS     LTG 4 Progress Met  -SS     LTG 5 Demo ability to ascend stairs reciprocally  -     LTG 5 Progress Met  -SS     LTG 6 LEFS score to be >/= 50/80  -SS     LTG 6 Progress Met  -SS       User Key  (r) = Recorded By, (t) = Taken By, (c) = Cosigned By    Initials Name Provider Type    ARELIS Garrett, PT DPT Physical Therapist                Therapy Education       10/26/17 1600          Therapy Education    Given HEP  " -SS      Program Reinforced  -SS      How Provided Verbal  -SS      Provided to Patient  -SS      Level of Understanding Verbalized  -SS        User Key  (r) = Recorded By, (t) = Taken By, (c) = Cosigned By    Initials Name Provider Type    ARELIS Garrett, PT DPT Physical Therapist                Outcome Measures       10/26/17 1600          Lower Extremity Functional Index    Any of your usual work, housework or school activities 3  -SS      Your usual hobbies, recreational or sporting activities 3  -SS      Getting into or out of the bath 4  -SS      Walking between rooms 4  -SS      Putting on your shoes or socks 4  -SS      Squatting 2  -SS      Lifting an object, like a bag of groceries from the floor 3  -SS      Performing light activities around your home 4  -SS      Performing heavy activities around your home 3  -SS      Getting into or out of a car 3  -SS      Walking 2 blocks 3  -SS      Walking a mile 3  -SS      Going up or down 10 stairs (about 1 flight of stairs) 3  -SS      Standing for 1 hour 3  -SS      Sitting for 1 hour 3  -SS      Running on even ground 3  -SS      Running on uneven ground 3  -SS      Making sharp turns while running fast 3  -SS      Hopping 3  -SS      Rolling over in bed 4  -SS      Total 64  -SS      Functional Assessment    Outcome Measure Options Lower Extremity Functional Scale (LEFS)  -SS        User Key  (r) = Recorded By, (t) = Taken By, (c) = Cosigned By    Initials Name Provider Type    ARELIS Garrett, PT DPT Physical Therapist            Time Calculation:   Start Time: 1614  Stop Time: 1635  Time Calculation (min): 21 min    Therapy Charges for Today     Code Description Service Date Service Provider Modifiers Qty    81608381689  PT SELF CARE/MGMT/TRAIN EA 15 MIN 10/26/2017 Kwan Garrett, PT DPT GP 1               OP PT Discharge Summary  Date of Discharge: 10/26/17  Reason for Discharge: Independent  Outcomes Achieved: Patient able to  partially achieve established goals (Achieved 5/5 STGs and 5/6 LTGs)  Discharge Destination: Home with home program  Discharge Instructions: Patient instructed to contact the P.T. clinic if any questions or problems arise.      Kwan Garrett, PT, DPT, CHT  10/26/2017

## 2017-10-30 ENCOUNTER — APPOINTMENT (OUTPATIENT)
Dept: PHYSICAL THERAPY | Facility: HOSPITAL | Age: 32
End: 2017-10-30

## 2017-11-01 ENCOUNTER — APPOINTMENT (OUTPATIENT)
Dept: PHYSICAL THERAPY | Facility: HOSPITAL | Age: 32
End: 2017-11-01

## 2017-11-10 RX ORDER — VALACYCLOVIR HYDROCHLORIDE 500 MG/1
500 TABLET, FILM COATED ORAL 2 TIMES DAILY
Qty: 20 TABLET | Refills: 5 | Status: SHIPPED | OUTPATIENT
Start: 2017-11-10 | End: 2018-05-04

## 2017-12-21 RX ORDER — CIPROFLOXACIN 500 MG/1
500 TABLET, FILM COATED ORAL EVERY 12 HOURS SCHEDULED
Qty: 20 TABLET | Refills: 0 | Status: SHIPPED | OUTPATIENT
Start: 2017-12-21 | End: 2018-04-20 | Stop reason: HOSPADM

## 2017-12-21 RX ORDER — FLUCONAZOLE 150 MG/1
150 TABLET ORAL ONCE
Qty: 2 TABLET | Refills: 0 | Status: SHIPPED | OUTPATIENT
Start: 2017-12-21 | End: 2017-12-21

## 2017-12-21 RX ORDER — PHENAZOPYRIDINE HYDROCHLORIDE 200 MG/1
200 TABLET, FILM COATED ORAL 3 TIMES DAILY PRN
Qty: 15 TABLET | Refills: 0 | Status: SHIPPED | OUTPATIENT
Start: 2017-12-21 | End: 2018-04-20 | Stop reason: SDUPTHER

## 2018-04-18 RX ORDER — NITROFURANTOIN 25; 75 MG/1; MG/1
100 CAPSULE ORAL EVERY 12 HOURS SCHEDULED
Qty: 20 CAPSULE | Refills: 0 | Status: SHIPPED | OUTPATIENT
Start: 2018-04-18 | End: 2018-05-04

## 2018-04-18 RX ORDER — PHENAZOPYRIDINE HYDROCHLORIDE 200 MG/1
200 TABLET, FILM COATED ORAL 3 TIMES DAILY PRN
Qty: 15 TABLET | Refills: 0 | Status: SHIPPED | OUTPATIENT
Start: 2018-04-18 | End: 2018-05-04

## 2018-04-18 RX ORDER — FLUCONAZOLE 150 MG/1
150 TABLET ORAL ONCE
Qty: 1 TABLET | Refills: 0 | Status: SHIPPED | OUTPATIENT
Start: 2018-04-18 | End: 2018-04-18

## 2018-04-20 ENCOUNTER — OFFICE VISIT (OUTPATIENT)
Dept: FAMILY MEDICINE CLINIC | Facility: CLINIC | Age: 33
End: 2018-04-20

## 2018-04-20 ENCOUNTER — APPOINTMENT (OUTPATIENT)
Dept: LAB | Facility: HOSPITAL | Age: 33
End: 2018-04-20

## 2018-04-20 VITALS
DIASTOLIC BLOOD PRESSURE: 80 MMHG | HEIGHT: 68 IN | TEMPERATURE: 99.4 F | SYSTOLIC BLOOD PRESSURE: 130 MMHG | WEIGHT: 174.3 LBS | BODY MASS INDEX: 26.42 KG/M2 | RESPIRATION RATE: 20 BRPM | HEART RATE: 80 BPM | OXYGEN SATURATION: 99 %

## 2018-04-20 DIAGNOSIS — N89.8 VAGINAL DISCHARGE: ICD-10-CM

## 2018-04-20 DIAGNOSIS — R30.0 DYSURIA: Primary | ICD-10-CM

## 2018-04-20 LAB
BILIRUB BLD-MCNC: NEGATIVE MG/DL
CANDIDA ALBICANS: NEGATIVE
CLARITY, POC: CLEAR
COLOR UR: ABNORMAL
GARDNERELLA VAGINALIS: POSITIVE
GLUCOSE UR STRIP-MCNC: NEGATIVE MG/DL
KETONES UR QL: NEGATIVE
LEUKOCYTE EST, POC: ABNORMAL
NITRITE UR-MCNC: NEGATIVE MG/ML
PH UR: 5 [PH] (ref 5–8)
PROT UR STRIP-MCNC: ABNORMAL MG/DL
RBC # UR STRIP: NEGATIVE /UL
SP GR UR: 1.02 (ref 1–1.03)
TRICHOMONAS VAGINALIS PCR: NEGATIVE
UROBILINOGEN UR QL: NORMAL

## 2018-04-20 PROCEDURE — 87591 N.GONORRHOEAE DNA AMP PROB: CPT | Performed by: NURSE PRACTITIONER

## 2018-04-20 PROCEDURE — 87661 TRICHOMONAS VAGINALIS AMPLIF: CPT | Performed by: NURSE PRACTITIONER

## 2018-04-20 PROCEDURE — 87480 CANDIDA DNA DIR PROBE: CPT | Performed by: NURSE PRACTITIONER

## 2018-04-20 PROCEDURE — 87491 CHLMYD TRACH DNA AMP PROBE: CPT | Performed by: NURSE PRACTITIONER

## 2018-04-20 PROCEDURE — 99213 OFFICE O/P EST LOW 20 MIN: CPT | Performed by: NURSE PRACTITIONER

## 2018-04-20 PROCEDURE — 87660 TRICHOMONAS VAGIN DIR PROBE: CPT | Performed by: NURSE PRACTITIONER

## 2018-04-20 PROCEDURE — 81002 URINALYSIS NONAUTO W/O SCOPE: CPT | Performed by: NURSE PRACTITIONER

## 2018-04-20 PROCEDURE — 87510 GARDNER VAG DNA DIR PROBE: CPT | Performed by: NURSE PRACTITIONER

## 2018-04-21 LAB
C TRACH RRNA CVX QL NAA+PROBE: NEGATIVE
N GONORRHOEA RRNA SPEC QL NAA+PROBE: NEGATIVE
T VAGINALIS DNA VAG QL PROBE+SIG AMP: NEGATIVE

## 2018-04-22 DIAGNOSIS — B96.89 BV (BACTERIAL VAGINOSIS): Primary | ICD-10-CM

## 2018-04-22 DIAGNOSIS — N76.0 BV (BACTERIAL VAGINOSIS): Primary | ICD-10-CM

## 2018-04-22 RX ORDER — METRONIDAZOLE 500 MG/1
500 TABLET ORAL 2 TIMES DAILY
Qty: 14 TABLET | Refills: 0 | Status: SHIPPED | OUTPATIENT
Start: 2018-04-22 | End: 2018-05-04

## 2018-04-23 ENCOUNTER — TELEPHONE (OUTPATIENT)
Dept: FAMILY MEDICINE CLINIC | Facility: CLINIC | Age: 33
End: 2018-04-23

## 2018-04-23 NOTE — TELEPHONE ENCOUNTER
Per ANDREI Grubbs, Dayana Ramirez was called and given recent lab results.  Instructed patient on prescription to take (2) tablets daily for seven days and to avoid alcohol while on medication and to use extra precaution if sexually active.  Patient verbalized understanding.  Continue current meds and follow up as planned or sooner if any problems.

## 2018-05-04 ENCOUNTER — OFFICE VISIT (OUTPATIENT)
Dept: FAMILY MEDICINE CLINIC | Facility: CLINIC | Age: 33
End: 2018-05-04

## 2018-05-04 VITALS
SYSTOLIC BLOOD PRESSURE: 120 MMHG | BODY MASS INDEX: 24.25 KG/M2 | DIASTOLIC BLOOD PRESSURE: 90 MMHG | HEIGHT: 68 IN | WEIGHT: 160 LBS

## 2018-05-04 DIAGNOSIS — L98.8 SKIN LESION OF BREAST: Primary | ICD-10-CM

## 2018-05-04 DIAGNOSIS — R30.0 DYSURIA: ICD-10-CM

## 2018-05-04 PROBLEM — N39.0 URINARY TRACT INFECTION WITHOUT HEMATURIA: Status: ACTIVE | Noted: 2018-05-04

## 2018-05-04 LAB
BILIRUB BLD-MCNC: NEGATIVE MG/DL
CLARITY, POC: CLEAR
COLOR UR: ABNORMAL
GLUCOSE UR STRIP-MCNC: NEGATIVE MG/DL
KETONES UR QL: NEGATIVE
LEUKOCYTE EST, POC: NEGATIVE
NITRITE UR-MCNC: NEGATIVE MG/ML
PH UR: 6.5 [PH] (ref 5–8)
PROT UR STRIP-MCNC: NEGATIVE MG/DL
RBC # UR STRIP: ABNORMAL /UL
SP GR UR: 1 (ref 1–1.03)
UROBILINOGEN UR QL: NORMAL

## 2018-05-04 PROCEDURE — 17110 DESTRUCTION B9 LES UP TO 14: CPT | Performed by: NURSE PRACTITIONER

## 2018-05-04 PROCEDURE — 99212 OFFICE O/P EST SF 10 MIN: CPT | Performed by: NURSE PRACTITIONER

## 2018-05-04 NOTE — PROGRESS NOTES
"  Chief Complaint   Patient presents with   • Suspicious Skin Lesion     right breast     Subjective   Dayana Bang is a 32 y.o. female.     Skin lesion right breast growing and changing       Rash   This is a new problem. Location: right breast  The rash is characterized by peeling and itchiness. Pertinent negatives include no congestion.        The following portions of the patient's history were reviewed and updated as appropriate: allergies, current medications, past social history and problem list.    Review of Systems   Constitutional: Negative.  Negative for activity change and appetite change.   HENT: Negative.  Negative for congestion and dental problem.    Eyes: Negative.    Respiratory: Negative.  Negative for apnea and chest tightness.    Cardiovascular: Negative.    Gastrointestinal: Negative.    Endocrine: Negative.    Genitourinary: Negative.    Musculoskeletal: Negative.    Skin: Positive for rash.        Changing skin lesion right breast    Allergic/Immunologic: Negative.    Neurological: Negative.    Hematological: Negative.    Psychiatric/Behavioral: Negative.        Objective   /90   Ht 172.5 cm (67.9\")   Wt 72.6 kg (160 lb)   LMP 04/10/2018 (Exact Date)   BMI 24.40 kg/m²   Physical Exam   Constitutional: She is oriented to person, place, and time. She appears well-developed and well-nourished. No distress.   HENT:   Head: Normocephalic and atraumatic.   Eyes: EOM are normal. Pupils are equal, round, and reactive to light.   Neck: Normal range of motion. Neck supple.   Cardiovascular: Normal rate, regular rhythm and normal heart sounds.    Pulmonary/Chest: Effort normal.   Abdominal: Soft. Bowel sounds are normal.   Musculoskeletal: Normal range of motion.   Neurological: She is alert and oriented to person, place, and time.   Skin: Skin is warm. She is not diaphoretic.   Skin lesion right breast-Used liquid nitrogen to freeze x 3 freezes-lesion-instructed on wound care follow " up     Instructed on proper follow up care-return if worsen or refer to derm as directed   Nursing note and vitals reviewed.      Assessment/Plan   Problem List Items Addressed This Visit        Genitourinary    Urinary tract infection without hematuria       Other    Skin lesion of breast - Primary      Other Visit Diagnoses    None.        No orders of the defined types were placed in this encounter.      Used liquid nitrogen to freeze x 3 freezes-lesion-instructed on wound care follow up

## 2018-06-25 RX ORDER — VALACYCLOVIR HYDROCHLORIDE 500 MG/1
500 TABLET, FILM COATED ORAL DAILY
Qty: 30 TABLET | Refills: 11 | Status: SHIPPED | OUTPATIENT
Start: 2018-06-25 | End: 2019-01-04

## 2019-03-21 ENCOUNTER — LAB (OUTPATIENT)
Dept: LAB | Facility: HOSPITAL | Age: 34
End: 2019-03-21

## 2019-03-21 ENCOUNTER — TRANSCRIBE ORDERS (OUTPATIENT)
Dept: LAB | Facility: HOSPITAL | Age: 34
End: 2019-03-21

## 2019-03-21 DIAGNOSIS — Z34.90 PREGNANCY, UNSPECIFIED GESTATIONAL AGE: Primary | ICD-10-CM

## 2019-03-21 DIAGNOSIS — Z34.90 PREGNANCY, UNSPECIFIED GESTATIONAL AGE: ICD-10-CM

## 2019-03-21 LAB
ABO GROUP BLD: NORMAL
ALBUMIN SERPL-MCNC: 4.5 G/DL (ref 3.4–4.8)
ALBUMIN/GLOB SERPL: 1.6 G/DL (ref 1.1–1.8)
ALP SERPL-CCNC: 48 U/L (ref 38–126)
ALT SERPL W P-5'-P-CCNC: 19 U/L (ref 9–52)
ANION GAP SERPL CALCULATED.3IONS-SCNC: 10 MMOL/L (ref 5–15)
AST SERPL-CCNC: 20 U/L (ref 14–36)
BASOPHILS # BLD AUTO: 0.03 10*3/MM3 (ref 0–0.2)
BASOPHILS NFR BLD AUTO: 0.4 % (ref 0–1.5)
BILIRUB SERPL-MCNC: 0.4 MG/DL (ref 0.2–1.3)
BLD GP AB SCN SERPL QL: NEGATIVE
BUN BLD-MCNC: 9 MG/DL (ref 7–21)
BUN/CREAT SERPL: 12.9 (ref 7–25)
CALCIUM SPEC-SCNC: 9.4 MG/DL (ref 8.4–10.2)
CHLORIDE SERPL-SCNC: 100 MMOL/L (ref 95–110)
CO2 SERPL-SCNC: 25 MMOL/L (ref 22–31)
CREAT BLD-MCNC: 0.7 MG/DL (ref 0.5–1)
DEPRECATED RDW RBC AUTO: 40 FL (ref 37–54)
EOSINOPHIL # BLD AUTO: 0.04 10*3/MM3 (ref 0–0.4)
EOSINOPHIL NFR BLD AUTO: 0.6 % (ref 0.3–6.2)
ERYTHROCYTE [DISTWIDTH] IN BLOOD BY AUTOMATED COUNT: 12.5 % (ref 12.3–15.4)
GFR SERPL CREATININE-BSD FRML MDRD: 95 ML/MIN/1.73 (ref 64–149)
GLOBULIN UR ELPH-MCNC: 2.8 GM/DL (ref 2.3–3.5)
GLUCOSE BLD-MCNC: 94 MG/DL (ref 60–100)
HCT VFR BLD AUTO: 39.7 % (ref 34–46.6)
HGB BLD-MCNC: 13.8 G/DL (ref 12–15.9)
IMM GRANULOCYTES # BLD AUTO: 0.02 10*3/MM3 (ref 0–0.05)
IMM GRANULOCYTES NFR BLD AUTO: 0.3 % (ref 0–0.5)
LYMPHOCYTES # BLD AUTO: 2.08 10*3/MM3 (ref 0.7–3.1)
LYMPHOCYTES NFR BLD AUTO: 29.8 % (ref 19.6–45.3)
Lab: NORMAL
MCH RBC QN AUTO: 30.5 PG (ref 26.6–33)
MCHC RBC AUTO-ENTMCNC: 34.8 G/DL (ref 31.5–35.7)
MCV RBC AUTO: 87.8 FL (ref 79–97)
MONOCYTES # BLD AUTO: 0.36 10*3/MM3 (ref 0.1–0.9)
MONOCYTES NFR BLD AUTO: 5.2 % (ref 5–12)
NEUTROPHILS # BLD AUTO: 4.45 10*3/MM3 (ref 1.4–7)
NEUTROPHILS NFR BLD AUTO: 63.7 % (ref 42.7–76)
NRBC BLD AUTO-RTO: 0 /100 WBC (ref 0–0)
PLATELET # BLD AUTO: 213 10*3/MM3 (ref 140–450)
PMV BLD AUTO: 8.6 FL (ref 6–12)
POTASSIUM BLD-SCNC: 4 MMOL/L (ref 3.5–5.1)
PROT SERPL-MCNC: 7.3 G/DL (ref 6.3–8.6)
RBC # BLD AUTO: 4.52 10*6/MM3 (ref 3.77–5.28)
RH BLD: POSITIVE
SODIUM BLD-SCNC: 135 MMOL/L (ref 137–145)
WBC NRBC COR # BLD: 6.98 10*3/MM3 (ref 3.4–10.8)

## 2019-03-21 PROCEDURE — 86762 RUBELLA ANTIBODY: CPT

## 2019-03-21 PROCEDURE — 87340 HEPATITIS B SURFACE AG IA: CPT

## 2019-03-21 PROCEDURE — 36415 COLL VENOUS BLD VENIPUNCTURE: CPT

## 2019-03-21 PROCEDURE — 86850 RBC ANTIBODY SCREEN: CPT

## 2019-03-21 PROCEDURE — 86803 HEPATITIS C AB TEST: CPT

## 2019-03-21 PROCEDURE — 80053 COMPREHEN METABOLIC PANEL: CPT

## 2019-03-21 PROCEDURE — G0432 EIA HIV-1/HIV-2 SCREEN: HCPCS

## 2019-03-21 PROCEDURE — 86900 BLOOD TYPING SEROLOGIC ABO: CPT

## 2019-03-21 PROCEDURE — 84702 CHORIONIC GONADOTROPIN TEST: CPT

## 2019-03-21 PROCEDURE — 86901 BLOOD TYPING SEROLOGIC RH(D): CPT

## 2019-03-21 PROCEDURE — 85025 COMPLETE CBC W/AUTO DIFF WBC: CPT

## 2019-03-22 LAB
HBV SURFACE AG SERPL QL IA: NEGATIVE
HCG INTACT+B SERPL-ACNC: 6925.8 MIU/ML
HCV AB SER DONR QL: NEGATIVE
HIV1+2 AB SER QL: NEGATIVE
RUBV IGG SER QL: ABNORMAL
RUBV IGG SER-ACNC: 22.1 IU/ML (ref 0–9.9)

## 2019-03-24 LAB — RPR SER QL: NORMAL

## 2019-12-09 ENCOUNTER — OFFICE VISIT (OUTPATIENT)
Dept: FAMILY MEDICINE CLINIC | Facility: CLINIC | Age: 34
End: 2019-12-09

## 2019-12-09 VITALS
DIASTOLIC BLOOD PRESSURE: 80 MMHG | SYSTOLIC BLOOD PRESSURE: 120 MMHG | WEIGHT: 182 LBS | HEIGHT: 68 IN | BODY MASS INDEX: 27.58 KG/M2

## 2019-12-09 DIAGNOSIS — F41.9 ANXIETY: Primary | ICD-10-CM

## 2019-12-09 PROCEDURE — 99213 OFFICE O/P EST LOW 20 MIN: CPT | Performed by: NURSE PRACTITIONER

## 2019-12-09 RX ORDER — BUSPIRONE HYDROCHLORIDE 10 MG/1
TABLET ORAL
Qty: 60 TABLET | Refills: 5 | Status: SHIPPED | OUTPATIENT
Start: 2019-12-09 | End: 2020-10-19

## 2019-12-09 RX ORDER — BUPROPION HYDROCHLORIDE 75 MG/1
75 TABLET ORAL DAILY
Qty: 30 TABLET | Refills: 11 | Status: SHIPPED | OUTPATIENT
Start: 2019-12-09 | End: 2020-01-02 | Stop reason: SDUPTHER

## 2019-12-09 NOTE — PROGRESS NOTES
"  Chief Complaint   Patient presents with   • Check up   • Anxiety     Subjective   Dayana Bang is a 34 y.o. female.     Anxiety   Presents for follow-up visit. Symptoms include decreased concentration, excessive worry, nervous/anxious behavior and panic. Patient reports no chest pain, compulsions, confusion, depressed mood, dizziness, feeling of choking, hyperventilation, impotence, insomnia, irritability, malaise, muscle tension, nausea, obsessions, palpitations, restlessness, shortness of breath or suicidal ideas. Symptoms occur most days. Nighttime awakenings: none.     Compliance with medications is %.        The following portions of the patient's history were reviewed and updated as appropriate: allergies, current medications, past social history and problem list.    Review of Systems   Constitutional: Negative for irritability.   Eyes: Negative.  Negative for photophobia, pain, redness and visual disturbance.   Respiratory: Negative.  Negative for apnea, cough, choking, chest tightness and shortness of breath.    Cardiovascular: Negative.  Negative for chest pain and palpitations.   Gastrointestinal: Negative.  Negative for nausea.   Endocrine: Negative.    Genitourinary: Negative.  Negative for impotence.         2 months post partum -c section baby    Musculoskeletal: Negative.    Skin: Negative.    Allergic/Immunologic: Negative.    Neurological: Negative for dizziness.   Hematological: Negative.    Psychiatric/Behavioral: Positive for decreased concentration. Negative for behavioral problems, confusion, dysphoric mood, hallucinations, self-injury, sleep disturbance and suicidal ideas. The patient is nervous/anxious. The patient does not have insomnia and is not hyperactive.         Increase in anxiety and stress -2 months post partum -relationship concerns with father of the child        Objective   /80   Ht 172.7 cm (68\")   Wt 82.6 kg (182 lb)   BMI 27.67 kg/m²   Physical Exam "   Constitutional: She is oriented to person, place, and time. She appears well-developed and well-nourished. No distress.   HENT:   Head: Normocephalic and atraumatic.   Right Ear: External ear normal.   Left Ear: External ear normal.   Mouth/Throat: Oropharynx is clear and moist.   Eyes: Pupils are equal, round, and reactive to light.   Cardiovascular: Normal rate, regular rhythm, normal heart sounds and intact distal pulses. Exam reveals no gallop and no friction rub.   No murmur heard.  Pulmonary/Chest: Effort normal and breath sounds normal. No stridor. No respiratory distress. She has no wheezes. She has no rales. She exhibits no tenderness.   Abdominal: Soft. Bowel sounds are normal. She exhibits no distension. There is no tenderness. There is no guarding.   Musculoskeletal: Normal range of motion.   Neurological: She is alert and oriented to person, place, and time. She displays normal reflexes. No cranial nerve deficit or sensory deficit. She exhibits normal muscle tone. Coordination normal.   Skin: Skin is warm. Capillary refill takes less than 2 seconds. She is not diaphoretic.   Psychiatric: She has a normal mood and affect. Her behavior is normal.   Nursing note and vitals reviewed.      Assessment/Plan   Problem List Items Addressed This Visit        Other    Anxiety - Primary           New Medications Ordered This Visit   Medications   • buPROPion (WELLBUTRIN) 75 MG tablet     Sig: Take 1 tablet by mouth Daily.     Dispense:  30 tablet     Refill:  11   • busPIRone (BUSPAR) 10 MG tablet     Sig: Use tid prn anxiety     Dispense:  60 tablet     Refill:  5       Stress relief discussed in length. Consider therapy, suggest yoga, exercise, meditation -patient is agrees-will call back if worsen for referral    It's not just what you eat, but when you eat  Eat breakfast, and eat smaller meals throughout the day. A healthy breakfast can jumpstart your metabolism, while eating small, healthy meals (rather than  the standard three large meals) keeps your energy up.   Avoid eating at night. Try to eat dinner earlier and fast for 14-16 hours until breakfast the next morning. Studies suggest that eating only when you’re most active and giving your digestive system a long break each day may help to regulate weight.

## 2020-01-02 RX ORDER — BUPROPION HYDROCHLORIDE 75 MG/1
75 TABLET ORAL DAILY
Qty: 90 TABLET | Refills: 3 | Status: SHIPPED | OUTPATIENT
Start: 2020-01-02 | End: 2020-10-19

## 2020-10-19 PROBLEM — D25.9 UTERINE FIBROID: Status: ACTIVE | Noted: 2019-09-30

## 2020-10-19 PROBLEM — O43.213 PLACENTA ACCRETA IN THIRD TRIMESTER: Status: ACTIVE | Noted: 2019-08-07

## 2020-11-04 ENCOUNTER — LAB (OUTPATIENT)
Dept: LAB | Facility: HOSPITAL | Age: 35
End: 2020-11-04

## 2020-11-04 ENCOUNTER — OFFICE VISIT (OUTPATIENT)
Dept: FAMILY MEDICINE CLINIC | Facility: CLINIC | Age: 35
End: 2020-11-04

## 2020-11-04 VITALS
SYSTOLIC BLOOD PRESSURE: 120 MMHG | DIASTOLIC BLOOD PRESSURE: 82 MMHG | TEMPERATURE: 98.1 F | WEIGHT: 179 LBS | BODY MASS INDEX: 27.13 KG/M2 | HEIGHT: 68 IN

## 2020-11-04 DIAGNOSIS — F41.9 ANXIETY: Primary | ICD-10-CM

## 2020-11-04 LAB
25(OH)D3 SERPL-MCNC: 42.8 NG/ML (ref 30–100)
ALBUMIN SERPL-MCNC: 4.6 G/DL (ref 3.5–5.2)
ALBUMIN/GLOB SERPL: 2 G/DL
ALP SERPL-CCNC: 60 U/L (ref 39–117)
ALT SERPL W P-5'-P-CCNC: 15 U/L (ref 1–33)
ANION GAP SERPL CALCULATED.3IONS-SCNC: 10.1 MMOL/L (ref 5–15)
AST SERPL-CCNC: 20 U/L (ref 1–32)
BASOPHILS # BLD AUTO: 0.04 10*3/MM3 (ref 0–0.2)
BASOPHILS NFR BLD AUTO: 0.7 % (ref 0–1.5)
BILIRUB SERPL-MCNC: 0.3 MG/DL (ref 0–1.2)
BUN SERPL-MCNC: 9 MG/DL (ref 6–20)
BUN/CREAT SERPL: 11 (ref 7–25)
CALCIUM SPEC-SCNC: 9 MG/DL (ref 8.6–10.5)
CHLORIDE SERPL-SCNC: 104 MMOL/L (ref 98–107)
CO2 SERPL-SCNC: 24.9 MMOL/L (ref 22–29)
CREAT SERPL-MCNC: 0.82 MG/DL (ref 0.57–1)
DEPRECATED RDW RBC AUTO: 39.4 FL (ref 37–54)
EOSINOPHIL # BLD AUTO: 0.15 10*3/MM3 (ref 0–0.4)
EOSINOPHIL NFR BLD AUTO: 2.6 % (ref 0.3–6.2)
ERYTHROCYTE [DISTWIDTH] IN BLOOD BY AUTOMATED COUNT: 13.1 % (ref 12.3–15.4)
GFR SERPL CREATININE-BSD FRML MDRD: 78 ML/MIN/1.73
GLOBULIN UR ELPH-MCNC: 2.3 GM/DL
GLUCOSE SERPL-MCNC: 86 MG/DL (ref 65–99)
HCT VFR BLD AUTO: 38.4 % (ref 34–46.6)
HGB BLD-MCNC: 13.5 G/DL (ref 12–15.9)
IMM GRANULOCYTES # BLD AUTO: 0.01 10*3/MM3 (ref 0–0.05)
IMM GRANULOCYTES NFR BLD AUTO: 0.2 % (ref 0–0.5)
IRON 24H UR-MRATE: 63 MCG/DL (ref 37–145)
LYMPHOCYTES # BLD AUTO: 1.78 10*3/MM3 (ref 0.7–3.1)
LYMPHOCYTES NFR BLD AUTO: 30.8 % (ref 19.6–45.3)
MAGNESIUM SERPL-MCNC: 2.2 MG/DL (ref 1.6–2.6)
MCH RBC QN AUTO: 29 PG (ref 26.6–33)
MCHC RBC AUTO-ENTMCNC: 35.2 G/DL (ref 31.5–35.7)
MCV RBC AUTO: 82.6 FL (ref 79–97)
MONOCYTES # BLD AUTO: 0.3 10*3/MM3 (ref 0.1–0.9)
MONOCYTES NFR BLD AUTO: 5.2 % (ref 5–12)
NEUTROPHILS NFR BLD AUTO: 3.49 10*3/MM3 (ref 1.7–7)
NEUTROPHILS NFR BLD AUTO: 60.5 % (ref 42.7–76)
NRBC BLD AUTO-RTO: 0 /100 WBC (ref 0–0.2)
PLATELET # BLD AUTO: 205 10*3/MM3 (ref 140–450)
PMV BLD AUTO: 9.5 FL (ref 6–12)
POTASSIUM SERPL-SCNC: 4.4 MMOL/L (ref 3.5–5.2)
PROT SERPL-MCNC: 6.9 G/DL (ref 6–8.5)
RBC # BLD AUTO: 4.65 10*6/MM3 (ref 3.77–5.28)
SODIUM SERPL-SCNC: 139 MMOL/L (ref 136–145)
TSH SERPL DL<=0.05 MIU/L-ACNC: 1.61 UIU/ML (ref 0.27–4.2)
VIT B12 BLD-MCNC: 261 PG/ML (ref 211–946)
WBC # BLD AUTO: 5.77 10*3/MM3 (ref 3.4–10.8)

## 2020-11-04 PROCEDURE — 36415 COLL VENOUS BLD VENIPUNCTURE: CPT | Performed by: NURSE PRACTITIONER

## 2020-11-04 PROCEDURE — 99214 OFFICE O/P EST MOD 30 MIN: CPT | Performed by: NURSE PRACTITIONER

## 2020-11-04 PROCEDURE — 82306 VITAMIN D 25 HYDROXY: CPT | Performed by: NURSE PRACTITIONER

## 2020-11-04 PROCEDURE — 85025 COMPLETE CBC W/AUTO DIFF WBC: CPT | Performed by: NURSE PRACTITIONER

## 2020-11-04 PROCEDURE — 80053 COMPREHEN METABOLIC PANEL: CPT | Performed by: NURSE PRACTITIONER

## 2020-11-04 PROCEDURE — 83735 ASSAY OF MAGNESIUM: CPT | Performed by: NURSE PRACTITIONER

## 2020-11-04 PROCEDURE — 82607 VITAMIN B-12: CPT | Performed by: NURSE PRACTITIONER

## 2020-11-04 PROCEDURE — 84443 ASSAY THYROID STIM HORMONE: CPT | Performed by: NURSE PRACTITIONER

## 2020-11-04 PROCEDURE — 83540 ASSAY OF IRON: CPT | Performed by: NURSE PRACTITIONER

## 2020-11-04 RX ORDER — BUSPIRONE HYDROCHLORIDE 5 MG/1
5 TABLET ORAL 3 TIMES DAILY PRN
Qty: 90 TABLET | Refills: 5 | Status: SHIPPED | OUTPATIENT
Start: 2020-11-04 | End: 2020-11-30 | Stop reason: SDUPTHER

## 2020-11-04 RX ORDER — ESCITALOPRAM OXALATE 10 MG/1
10 TABLET ORAL DAILY
Qty: 30 TABLET | Refills: 11 | Status: SHIPPED | OUTPATIENT
Start: 2020-11-04 | End: 2020-11-30 | Stop reason: SDUPTHER

## 2020-11-04 NOTE — PROGRESS NOTES
Chief Complaint   Patient presents with   • Anxiety     ? labs     Subjective   Dayanashayan Bang is a 35 y.o. female.     Anxiety  Presents for follow-up visit. Symptoms include decreased concentration, excessive worry, nervous/anxious behavior and panic. Patient reports no chest pain, compulsions, confusion, depressed mood, dizziness, feeling of choking, hyperventilation, impotence, insomnia, irritability, malaise, muscle tension, nausea, obsessions, palpitations, restlessness, shortness of breath or suicidal ideas. Symptoms occur most days. Nighttime awakenings: none.     Compliance with medications is %.        The following portions of the patient's history were reviewed and updated as appropriate: allergies, current medications, past social history and problem list.    Review of Systems   Constitutional: Negative.  Negative for activity change, appetite change and irritability.   Eyes: Negative.  Negative for photophobia, pain, discharge, redness, itching and visual disturbance.   Respiratory: Negative.  Negative for apnea, cough, choking, chest tightness, shortness of breath and stridor.    Cardiovascular: Negative.  Negative for chest pain and palpitations.   Gastrointestinal: Negative.  Negative for nausea.   Endocrine: Negative for cold intolerance, heat intolerance, polydipsia, polyphagia and polyuria.   Genitourinary: Negative.  Negative for impotence.   Musculoskeletal: Negative.    Skin: Negative.    Allergic/Immunologic: Negative.  Negative for environmental allergies, food allergies and immunocompromised state.   Neurological: Negative.  Negative for dizziness.   Hematological: Negative.  Negative for adenopathy. Does not bruise/bleed easily.   Psychiatric/Behavioral: Positive for agitation, decreased concentration and sleep disturbance. Negative for behavioral problems, confusion, dysphoric mood, hallucinations, self-injury and suicidal ideas. The patient is nervous/anxious. The patient  "does not have insomnia and is not hyperactive.         Increase in anxiety       Objective   /82   Temp 98.1 °F (36.7 °C) (Tympanic)   Ht 172.7 cm (68\")   Wt 81.2 kg (179 lb)   LMP 04/10/2018 (Exact Date)   BMI 27.22 kg/m²   Physical Exam  Vitals signs and nursing note reviewed.   HENT:      Head: Normocephalic.      Right Ear: Tympanic membrane normal. There is no impacted cerumen.      Left Ear: There is no impacted cerumen.      Nose: No rhinorrhea.      Mouth/Throat:      Mouth: Mucous membranes are moist.   Eyes:      Pupils: Pupils are equal, round, and reactive to light.   Cardiovascular:      Rate and Rhythm: Normal rate.      Pulses: Normal pulses.      Heart sounds: No murmur. No friction rub. No gallop.    Pulmonary:      Effort: Pulmonary effort is normal. No respiratory distress.      Breath sounds: Normal breath sounds. No stridor. No wheezing, rhonchi or rales.   Chest:      Chest wall: No tenderness.   Abdominal:      General: Abdomen is flat. There is no distension.      Palpations: Abdomen is soft. There is no mass.      Tenderness: There is no abdominal tenderness.      Hernia: No hernia is present.   Skin:     General: Skin is warm.      Findings: No bruising, erythema, lesion or rash.   Neurological:      General: No focal deficit present.      Mental Status: She is oriented to person, place, and time.         Assessment/Plan   Problems Addressed this Visit        Other    Anxiety - Primary    Relevant Orders    CBC & Differential    Comprehensive Metabolic Panel    Iron    TSH    Vitamin B12    Vitamin D 25 Hydroxy    Magnesium    CBC Auto Differential      Diagnoses       Codes Comments    Anxiety    -  Primary ICD-10-CM: F41.9  ICD-9-CM: 300.00            New Medications Ordered This Visit   Medications   • escitalopram (Lexapro) 10 MG tablet     Sig: Take 1 tablet by mouth Daily.     Dispense:  30 tablet     Refill:  11   • busPIRone (BUSPAR) 5 MG tablet     Sig: Take 1 tablet by " mouth 3 (Three) Times a Day As Needed (tid prn).     Dispense:  90 tablet     Refill:  5       It's not just what you eat, but when you eat  Eat breakfast, and eat smaller meals throughout the day. A healthy breakfast can jumpstart your metabolism, while eating small, healthy meals (rather than the standard three large meals) keeps your energy up.   Avoid eating at night. Try to eat dinner earlier and fast for 14-16 hours until breakfast the next morning. Studies suggest that eating only when you’re most active and giving your digestive system a long break each day may help to regulate weight.     Labs as directed, meds as directed, lexapro and buspar -follow up if worsen -labs today

## 2020-11-05 ENCOUNTER — TELEPHONE (OUTPATIENT)
Dept: FAMILY MEDICINE CLINIC | Facility: CLINIC | Age: 35
End: 2020-11-05

## 2020-11-05 NOTE — PROGRESS NOTES
Per ANDREI Clinton, Ms. Bang has been called with recent lab results & recommendations.  Continue current medications and follow-up as planned or sooner if any problems.

## 2020-11-05 NOTE — TELEPHONE ENCOUNTER
-Per ANDREI Clinton, Ms. Bang has been called with recent lab results & recommendations.  Continue current medications and follow-up as planned or sooner if any problems.        ---- Message from ANDREI Mccarty sent at 11/5/2020  7:46 AM CST -----  Can you let her know labs look good no changes needed

## 2020-11-30 RX ORDER — ESCITALOPRAM OXALATE 10 MG/1
10 TABLET ORAL DAILY
Qty: 90 TABLET | Refills: 3 | Status: SHIPPED | OUTPATIENT
Start: 2020-11-30 | End: 2021-11-12

## 2020-11-30 RX ORDER — BUSPIRONE HYDROCHLORIDE 5 MG/1
5 TABLET ORAL 3 TIMES DAILY PRN
Qty: 270 TABLET | Refills: 3 | Status: SHIPPED | OUTPATIENT
Start: 2020-11-30 | End: 2021-11-12

## 2021-09-03 ENCOUNTER — IMMUNIZATION (OUTPATIENT)
Dept: VACCINE CLINIC | Facility: HOSPITAL | Age: 36
End: 2021-09-03

## 2021-09-03 PROCEDURE — 0001A: CPT | Performed by: NURSE PRACTITIONER

## 2021-09-03 PROCEDURE — 91300 HC SARSCOV02 VAC 30MCG/0.3ML IM: CPT | Performed by: NURSE PRACTITIONER

## 2021-09-24 ENCOUNTER — IMMUNIZATION (OUTPATIENT)
Dept: VACCINE CLINIC | Facility: HOSPITAL | Age: 36
End: 2021-09-24

## 2021-09-24 PROCEDURE — 91300 HC SARSCOV02 VAC 30MCG/0.3ML IM: CPT | Performed by: NURSE PRACTITIONER

## 2021-09-24 PROCEDURE — 0002A: CPT | Performed by: NURSE PRACTITIONER

## 2021-11-12 ENCOUNTER — OFFICE VISIT (OUTPATIENT)
Dept: FAMILY MEDICINE CLINIC | Facility: CLINIC | Age: 36
End: 2021-11-12

## 2021-11-12 VITALS
TEMPERATURE: 97.4 F | WEIGHT: 180 LBS | SYSTOLIC BLOOD PRESSURE: 110 MMHG | BODY MASS INDEX: 27.28 KG/M2 | DIASTOLIC BLOOD PRESSURE: 82 MMHG | HEIGHT: 68 IN

## 2021-11-12 DIAGNOSIS — F41.9 ANXIETY: ICD-10-CM

## 2021-11-12 DIAGNOSIS — H61.92 SKIN LESION OF LEFT EAR: Primary | ICD-10-CM

## 2021-11-12 PROCEDURE — 99213 OFFICE O/P EST LOW 20 MIN: CPT | Performed by: NURSE PRACTITIONER

## 2021-11-12 RX ORDER — BUPROPION HYDROCHLORIDE 75 MG/1
75 TABLET ORAL DAILY
Qty: 30 TABLET | Refills: 11 | Status: SHIPPED | OUTPATIENT
Start: 2021-11-12 | End: 2021-12-08 | Stop reason: SDUPTHER

## 2021-11-12 NOTE — PROGRESS NOTES
Chief Complaint   Patient presents with   • Skin Lesion     left ear   • Check up     Subjective   Dayana Bang is a 36 y.o. female.           Presents with lesion to left ear     Abrasion  This is a new problem. The problem occurs intermittently. The problem has been gradually worsening. Pertinent negatives include no abdominal pain, anorexia, arthralgias, change in bowel habit, chest pain, chills, congestion, coughing, diaphoresis, fatigue, fever, headaches, joint swelling, myalgias, nausea, rash, sore throat, swollen glands, urinary symptoms, vertigo or weakness. The treatment provided significant relief.   Anxiety  Presents for follow-up visit. Symptoms include decreased concentration, excessive worry, irritability and nervous/anxious behavior. Patient reports no chest pain, confusion, dizziness, hyperventilation, impotence, nausea, palpitations, shortness of breath or suicidal ideas. The quality of sleep is good. Nighttime awakenings: none.     Compliance with medications is %.        The following portions of the patient's history were reviewed and updated as appropriate: allergies, current medications, past social history and problem list.    Review of Systems   Constitutional: Positive for irritability. Negative for activity change, appetite change, chills, diaphoresis, fatigue and fever.   HENT: Negative for congestion, dental problem, drooling, ear discharge, ear pain, facial swelling and sore throat.    Eyes: Negative.  Negative for photophobia, pain, discharge, redness, itching and visual disturbance.   Respiratory: Negative.  Negative for apnea, cough, choking, chest tightness, shortness of breath, wheezing and stridor.    Cardiovascular: Negative.  Negative for chest pain, palpitations and leg swelling.   Gastrointestinal: Negative.  Negative for abdominal pain, anorexia, change in bowel habit and nausea.   Endocrine: Negative for cold intolerance, heat intolerance, polydipsia,  "polyphagia and polyuria.   Genitourinary: Negative.  Negative for difficulty urinating, dyspareunia, dysuria and impotence.   Musculoskeletal: Negative.  Negative for arthralgias, joint swelling and myalgias.   Skin: Negative.  Negative for rash.   Allergic/Immunologic: Negative.  Negative for environmental allergies, food allergies and immunocompromised state.   Neurological: Negative.  Negative for dizziness, vertigo, tremors, seizures, syncope, facial asymmetry, speech difficulty, weakness, light-headedness and headaches.   Hematological: Negative.  Negative for adenopathy. Does not bruise/bleed easily.   Psychiatric/Behavioral: Positive for agitation, decreased concentration and sleep disturbance. Negative for behavioral problems, confusion, dysphoric mood, hallucinations, self-injury and suicidal ideas. The patient is nervous/anxious. The patient is not hyperactive.         Increase in anxiety-requesting meds       Objective   /82   Temp 97.4 °F (36.3 °C) (Temporal)   Ht 172.7 cm (68\")   Wt 81.6 kg (180 lb)   LMP 04/10/2018 (Exact Date)   BMI 27.37 kg/m²   Physical Exam  Vitals and nursing note reviewed.   Constitutional:       General: She is not in acute distress.     Appearance: She is not ill-appearing.   HENT:      Head: Normocephalic.      Right Ear: Tympanic membrane normal. There is no impacted cerumen.      Left Ear: There is no impacted cerumen.      Nose: No rhinorrhea.      Mouth/Throat:      Mouth: Mucous membranes are moist.      Pharynx: No oropharyngeal exudate or posterior oropharyngeal erythema.   Eyes:      Pupils: Pupils are equal, round, and reactive to light.   Cardiovascular:      Rate and Rhythm: Normal rate.      Pulses: Normal pulses.      Heart sounds: No murmur heard.  No friction rub. No gallop.    Pulmonary:      Effort: Pulmonary effort is normal. No respiratory distress.      Breath sounds: Normal breath sounds. No stridor. No wheezing, rhonchi or rales.   Chest:      " Chest wall: No tenderness.   Abdominal:      General: Abdomen is flat. There is no distension.      Palpations: Abdomen is soft. There is no mass.      Tenderness: There is no abdominal tenderness.      Hernia: No hernia is present.   Musculoskeletal:         General: Normal range of motion.   Skin:     General: Skin is warm.      Findings: Lesion present. No bruising, erythema or rash.      Comments: Skin lesion left earlobe, growing and changing    Neurological:      General: No focal deficit present.      Mental Status: She is oriented to person, place, and time.      Cranial Nerves: No cranial nerve deficit.      Sensory: No sensory deficit.      Motor: No weakness.      Coordination: Coordination normal.   Psychiatric:         Mood and Affect: Mood normal.         Behavior: Behavior normal.              Assessment/Plan     Problems Addressed this Visit        Mental Health    Anxiety      Other Visit Diagnoses     Skin lesion    -  Primary    Skin lesion of left ear        Relevant Orders    Ambulatory Referral to Plastic Surgery (Completed)      Diagnoses       Codes Comments    Skin lesion    -  Primary ICD-10-CM: L98.9  ICD-9-CM: 709.9     Skin lesion of left ear     ICD-10-CM: H61.92  ICD-9-CM: 380.9     Anxiety     ICD-10-CM: F41.9  ICD-9-CM: 300.00            New Medications Ordered This Visit   Medications   • buPROPion (WELLBUTRIN) 75 MG tablet     Sig: Take 1 tablet by mouth Daily.     Dispense:  30 tablet     Refill:  11     Current Outpatient Medications on File Prior to Visit   Medication Sig Dispense Refill   • [DISCONTINUED] busPIRone (BUSPAR) 5 MG tablet Take 1 tablet by mouth 3 (Three) Times a Day As Needed (tid prn). 270 tablet 3   • [DISCONTINUED] escitalopram (Lexapro) 10 MG tablet Take 1 tablet by mouth Daily. 90 tablet 3     No current facility-administered medications on file prior to visit.       15  Follow Up   No follow-ups on file.        It's not just what you eat, but when you  eat  Eat breakfast, and eat smaller meals throughout the day. A healthy breakfast can jumpstart your metabolism, while eating small, healthy meals (rather than the standard three large meals) keeps your energy up.   Avoid eating at night. Try to eat dinner earlier and fast for 14-16 hours until breakfast the next morning. Studies suggest that eating only when you’re most active and giving your digestive system a long break each day may help to regulate weight.     Refer to plastic surgery as directed -add wellbutrin daily as directed follow up if worsen    Postop Diagnosis: same

## 2021-12-08 RX ORDER — BUPROPION HYDROCHLORIDE 75 MG/1
75 TABLET ORAL DAILY
Qty: 90 TABLET | Refills: 3 | Status: SHIPPED | OUTPATIENT
Start: 2021-12-08 | End: 2022-03-25

## 2022-03-11 ENCOUNTER — TELEPHONE (OUTPATIENT)
Dept: FAMILY MEDICINE CLINIC | Facility: CLINIC | Age: 37
End: 2022-03-11

## 2022-03-11 NOTE — TELEPHONE ENCOUNTER
Patient had left a message on [employer] answering machine that she needed to talk to Jenny in [place]'s office. Phone is [phone]

## 2022-03-25 ENCOUNTER — OFFICE VISIT (OUTPATIENT)
Dept: FAMILY MEDICINE CLINIC | Facility: CLINIC | Age: 37
End: 2022-03-25

## 2022-03-25 VITALS
DIASTOLIC BLOOD PRESSURE: 86 MMHG | BODY MASS INDEX: 27.28 KG/M2 | SYSTOLIC BLOOD PRESSURE: 120 MMHG | HEART RATE: 87 BPM | OXYGEN SATURATION: 98 % | HEIGHT: 68 IN | WEIGHT: 180 LBS

## 2022-03-25 DIAGNOSIS — R53.83 MALAISE AND FATIGUE: ICD-10-CM

## 2022-03-25 DIAGNOSIS — E66.3 OVERWEIGHT (BMI 25.0-29.9): ICD-10-CM

## 2022-03-25 DIAGNOSIS — R53.81 MALAISE AND FATIGUE: ICD-10-CM

## 2022-03-25 DIAGNOSIS — Z00.00 GENERAL MEDICAL EXAM: Primary | ICD-10-CM

## 2022-03-25 LAB
QT INTERVAL: 412 MS
QTC INTERVAL: 447 MS

## 2022-03-25 PROCEDURE — 93010 ELECTROCARDIOGRAM REPORT: CPT | Performed by: INTERNAL MEDICINE

## 2022-03-25 PROCEDURE — 99213 OFFICE O/P EST LOW 20 MIN: CPT | Performed by: NURSE PRACTITIONER

## 2022-03-25 PROCEDURE — 93005 ELECTROCARDIOGRAM TRACING: CPT | Performed by: NURSE PRACTITIONER

## 2022-03-25 RX ORDER — PHENTERMINE HYDROCHLORIDE 37.5 MG/1
37.5 TABLET ORAL
Qty: 30 TABLET | Refills: 0 | Status: SHIPPED | OUTPATIENT
Start: 2022-03-25 | End: 2022-05-16 | Stop reason: SDUPTHER

## 2022-03-25 NOTE — PROGRESS NOTES
Chief Complaint   Patient presents with   • Annual Exam     Blood work     Subjective   Dayana Bang is a 36 y.o. female.      {Problem List  Visit Diagnosis   Encounters  Notes  Medications  Labs  Result Review Imaging  Media :23}     Presents with yearly exam , here to review labs from another facility-see care everywhere and reports overweight and making her feel tired     Obesity  This is a recurrent problem. The current episode started 1 to 4 weeks ago. The problem occurs every several days. The problem has been gradually worsening. Associated symptoms include fatigue and myalgias. Pertinent negatives include no abdominal pain, change in bowel habit, chills, congestion, coughing, diaphoresis, numbness, swollen glands, urinary symptoms, visual change, vomiting or weakness. Treatments tried: eating less and exercising  The treatment provided no relief.   Fatigue  This is a recurrent problem. The current episode started 1 to 4 weeks ago. The problem occurs every several days. The problem has been waxing and waning. Associated symptoms include fatigue and myalgias. Pertinent negatives include no abdominal pain, change in bowel habit, chills, congestion, coughing, diaphoresis, numbness, swollen glands, urinary symptoms, visual change, vomiting or weakness. She has tried rest for the symptoms. The treatment provided mild relief.        The following portions of the patient's history were reviewed and updated as appropriate: allergies, current medications, past social history and problem list.    Review of Systems   Constitutional: Positive for fatigue and unexpected weight change. Negative for activity change, appetite change, chills and diaphoresis.   HENT: Negative for congestion, dental problem, drooling, facial swelling and hearing loss.    Eyes: Negative.  Negative for photophobia, pain, discharge, redness, itching and visual disturbance.   Respiratory: Negative.  Negative for apnea, cough,  "choking, chest tightness and stridor.    Cardiovascular: Negative.  Negative for leg swelling.   Gastrointestinal: Negative.  Negative for abdominal distention, abdominal pain, anal bleeding, blood in stool, change in bowel habit, constipation and vomiting.   Endocrine: Negative for cold intolerance, heat intolerance, polydipsia, polyphagia and polyuria.   Genitourinary: Negative.    Musculoskeletal: Positive for myalgias.   Skin: Negative.    Allergic/Immunologic: Negative.  Negative for environmental allergies, food allergies and immunocompromised state.   Neurological: Negative.  Negative for tremors, seizures, syncope, speech difficulty, weakness and numbness.   Hematological: Negative.  Negative for adenopathy. Does not bruise/bleed easily.   Psychiatric/Behavioral: Positive for sleep disturbance. Negative for agitation, behavioral problems, confusion, dysphoric mood, hallucinations and self-injury. The patient is not hyperactive.         Increase in anxiety       Objective   /86   Pulse 87   Ht 172.7 cm (68\")   Wt 81.6 kg (180 lb)   LMP 04/10/2018 (Exact Date)   SpO2 98%   BMI 27.37 kg/m²   Physical Exam  Vitals and nursing note reviewed.   HENT:      Head: Normocephalic.      Right Ear: Tympanic membrane normal. There is no impacted cerumen.      Left Ear: There is no impacted cerumen.      Nose: No rhinorrhea.      Mouth/Throat:      Mouth: Mucous membranes are moist.   Eyes:      Pupils: Pupils are equal, round, and reactive to light.   Cardiovascular:      Rate and Rhythm: Normal rate.      Pulses: Normal pulses.      Heart sounds: No murmur heard.    No friction rub. No gallop.   Pulmonary:      Effort: Pulmonary effort is normal. No respiratory distress.      Breath sounds: Normal breath sounds. No stridor. No wheezing, rhonchi or rales.   Chest:      Chest wall: No tenderness.   Abdominal:      General: Abdomen is flat. There is no distension.      Palpations: Abdomen is soft. There is no " mass.      Tenderness: There is no abdominal tenderness.      Hernia: No hernia is present.   Skin:     General: Skin is warm.      Findings: No bruising, erythema, lesion or rash.   Neurological:      General: No focal deficit present.      Mental Status: She is oriented to person, place, and time.      Cranial Nerves: No cranial nerve deficit.      Sensory: No sensory deficit.      Motor: No weakness.      Coordination: Coordination normal.      Gait: Gait normal.      Deep Tendon Reflexes: Reflexes normal.   Psychiatric:         Mood and Affect: Mood normal.         Behavior: Behavior normal.              Assessment/Plan     Problems Addressed this Visit    None     Visit Diagnoses     General medical exam    -  Primary    Relevant Orders    CBC & Differential    Comprehensive Metabolic Panel    Hemoglobin A1c    Lipid Panel    Iron    Vitamin D 25 Hydroxy    Vitamin B12    TSH    ECG 12 Lead (Completed)    Overweight (BMI 25.0-29.9)        Relevant Medications    phentermine (ADIPEX-P) 37.5 MG tablet    Malaise and fatigue          Diagnoses       Codes Comments    General medical exam    -  Primary ICD-10-CM: Z00.00  ICD-9-CM: V70.9     Overweight (BMI 25.0-29.9)     ICD-10-CM: E66.3  ICD-9-CM: 278.02     Malaise and fatigue     ICD-10-CM: R53.81, R53.83  ICD-9-CM: 780.79            New Medications Ordered This Visit   Medications   • phentermine (ADIPEX-P) 37.5 MG tablet     Sig: Take 1 tablet by mouth Every Morning Before Breakfast.     Dispense:  30 tablet     Refill:  0     Current Outpatient Medications on File Prior to Visit   Medication Sig Dispense Refill   • [DISCONTINUED] buPROPion (WELLBUTRIN) 75 MG tablet Take 1 tablet by mouth Daily. 90 tablet 3     No current facility-administered medications on file prior to visit.       20 minutes  Follow Up   Return in about 2 months (around 5/25/2022).        It's not just what you eat, but when you eat  Eat breakfast, and eat smaller meals throughout the day.  A healthy breakfast can jumpstart your metabolism, while eating small, healthy meals (rather than the standard three large meals) keeps your energy up.   Avoid eating at night. Try to eat dinner earlier and fast for 14-16 hours until breakfast the next morning. Studies suggest that eating only when you’re most active and giving your digestive system a long break each day may help to regulate weight.     Labs from another facility reviewed-beth paz reviewed     Patient understands the risks associated with this controlled medication, including tolerance and addiction.  she also agrees to only obtain this medication from me, and not from a another provider, unless that provider is covering for me in my absence.  she also agrees to be compliant in dosing, and not self adjust the dose of medication.  A signed controlled substance agreement is on file, and she has received a controlled substance education sheet at this a previous visit.  she has also signed a consent for treatment with a controlled substance as per [facility] policy. PRABHAKAR was obtained.    meds as directed, thyroid good-monitor heart rate, ekg good(artifact), see back in 2 months as directed

## 2022-05-16 DIAGNOSIS — E66.3 OVERWEIGHT (BMI 25.0-29.9): ICD-10-CM

## 2022-05-16 RX ORDER — PHENTERMINE HYDROCHLORIDE 37.5 MG/1
37.5 TABLET ORAL
Qty: 30 TABLET | Refills: 0 | Status: SHIPPED | OUTPATIENT
Start: 2022-05-16 | End: 2022-05-27 | Stop reason: SDUPTHER

## 2022-05-27 ENCOUNTER — OFFICE VISIT (OUTPATIENT)
Dept: FAMILY MEDICINE CLINIC | Facility: CLINIC | Age: 37
End: 2022-05-27

## 2022-05-27 VITALS
HEART RATE: 88 BPM | DIASTOLIC BLOOD PRESSURE: 80 MMHG | SYSTOLIC BLOOD PRESSURE: 112 MMHG | OXYGEN SATURATION: 99 % | WEIGHT: 176 LBS | BODY MASS INDEX: 26.67 KG/M2 | HEIGHT: 68 IN

## 2022-05-27 DIAGNOSIS — R53.81 MALAISE AND FATIGUE: Primary | ICD-10-CM

## 2022-05-27 DIAGNOSIS — R53.83 MALAISE AND FATIGUE: Primary | ICD-10-CM

## 2022-05-27 DIAGNOSIS — E66.3 OVERWEIGHT (BMI 25.0-29.9): ICD-10-CM

## 2022-05-27 PROCEDURE — 99213 OFFICE O/P EST LOW 20 MIN: CPT | Performed by: NURSE PRACTITIONER

## 2022-05-27 RX ORDER — PHENTERMINE HYDROCHLORIDE 37.5 MG/1
37.5 TABLET ORAL
Qty: 30 TABLET | Refills: 0 | Status: SHIPPED | OUTPATIENT
Start: 2022-05-27 | End: 2022-07-08 | Stop reason: SDUPTHER

## 2022-05-27 NOTE — PROGRESS NOTES
Chief Complaint   Patient presents with   • Weight Check     1 month      Subjective   Dayana Macy Bang is a 36 y.o. female.           Obesity  This is a recurrent problem. The current episode started 1 to 4 weeks ago. The problem occurs every several days. The problem has been gradually improving. Associated symptoms include fatigue. Pertinent negatives include no arthralgias, chills, congestion, coughing, diaphoresis, fever, headaches, numbness, rash, sore throat, swollen glands, urinary symptoms, vertigo, visual change, vomiting or weakness. Treatments tried: eating less and exercising -phenteramine  The treatment provided no relief.   Fatigue  This is a recurrent problem. The current episode started 1 to 4 weeks ago. The problem occurs every several days. The problem has been waxing and waning. Associated symptoms include fatigue. Pertinent negatives include no arthralgias, chills, congestion, coughing, diaphoresis, fever, headaches, numbness, rash, sore throat, swollen glands, urinary symptoms, vertigo, visual change, vomiting or weakness. She has tried rest for the symptoms. The treatment provided mild relief.        The following portions of the patient's history were reviewed and updated as appropriate: allergies, current medications, past social history and problem list.    Review of Systems   Constitutional: Positive for fatigue and unexpected weight change. Negative for activity change, appetite change, chills, diaphoresis and fever.        Meds helping with weight loss    HENT: Negative for congestion, dental problem, drooling, ear discharge, ear pain, facial swelling, hearing loss, mouth sores, nosebleeds, postnasal drip and sore throat.    Eyes: Negative.  Negative for photophobia, pain, discharge, redness, itching and visual disturbance.   Respiratory: Negative.  Negative for apnea, cough, choking, chest tightness, shortness of breath, wheezing and stridor.    Cardiovascular: Negative.  Negative  "for leg swelling.   Gastrointestinal: Negative.  Negative for abdominal distention, anal bleeding, blood in stool, constipation and vomiting.   Endocrine: Negative for cold intolerance, heat intolerance, polydipsia, polyphagia and polyuria.   Genitourinary: Negative.    Musculoskeletal: Negative for arthralgias, back pain and gait problem.   Skin: Negative.  Negative for rash.   Allergic/Immunologic: Negative.  Negative for environmental allergies, food allergies and immunocompromised state.   Neurological: Negative.  Negative for vertigo, tremors, seizures, syncope, speech difficulty, weakness, light-headedness, numbness and headaches.   Hematological: Negative.  Negative for adenopathy. Does not bruise/bleed easily.   Psychiatric/Behavioral: Positive for sleep disturbance. Negative for agitation, behavioral problems, confusion, dysphoric mood, hallucinations and self-injury. The patient is not hyperactive.        Objective   /80   Pulse 88   Ht 172.7 cm (68\")   Wt 79.8 kg (176 lb)   LMP 04/10/2018 (Exact Date)   SpO2 99%   BMI 26.76 kg/m²   Physical Exam  Vitals and nursing note reviewed.   Constitutional:       Appearance: Normal appearance.   HENT:      Head: Normocephalic and atraumatic.      Right Ear: Tympanic membrane normal. There is no impacted cerumen.      Left Ear: Tympanic membrane normal. There is no impacted cerumen.      Nose: No congestion or rhinorrhea.      Mouth/Throat:      Mouth: Mucous membranes are moist.   Eyes:      General: No scleral icterus.        Right eye: No discharge.         Left eye: No discharge.      Pupils: Pupils are equal, round, and reactive to light.   Cardiovascular:      Rate and Rhythm: Normal rate and regular rhythm.      Pulses: Normal pulses.      Heart sounds: No murmur heard.    No friction rub. No gallop.   Pulmonary:      Effort: Pulmonary effort is normal. No respiratory distress.      Breath sounds: Normal breath sounds. No stridor. No wheezing, " rhonchi or rales.   Chest:      Chest wall: No tenderness.   Abdominal:      General: Abdomen is flat. There is no distension.      Palpations: Abdomen is soft. There is no mass.      Tenderness: There is no abdominal tenderness. There is no guarding or rebound.      Hernia: No hernia is present.   Musculoskeletal:      Cervical back: Normal range of motion.   Skin:     General: Skin is warm.      Coloration: Skin is not jaundiced or pale.      Findings: No bruising, erythema, lesion or rash.   Neurological:      General: No focal deficit present.      Mental Status: She is alert and oriented to person, place, and time.      Cranial Nerves: No cranial nerve deficit.      Sensory: No sensory deficit.      Motor: No weakness.      Coordination: Coordination normal.      Gait: Gait normal.      Deep Tendon Reflexes: Reflexes normal.   Psychiatric:         Mood and Affect: Mood normal.         Behavior: Behavior normal.              Assessment & Plan     Problems Addressed this Visit    None     Visit Diagnoses     Malaise and fatigue    -  Primary    Overweight (BMI 25.0-29.9)        Relevant Medications    phentermine (ADIPEX-P) 37.5 MG tablet      Diagnoses       Codes Comments    Malaise and fatigue    -  Primary ICD-10-CM: R53.81, R53.83  ICD-9-CM: 780.79     Overweight (BMI 25.0-29.9)     ICD-10-CM: E66.3  ICD-9-CM: 278.02            New Medications Ordered This Visit   Medications   • phentermine (ADIPEX-P) 37.5 MG tablet     Sig: Take 1 tablet by mouth Every Morning Before Breakfast.     Dispense:  30 tablet     Refill:  0     Current Outpatient Medications on File Prior to Visit   Medication Sig Dispense Refill   • [DISCONTINUED] phentermine (ADIPEX-P) 37.5 MG tablet Take 1 tablet by mouth Every Morning Before Breakfast. 30 tablet 0     No current facility-administered medications on file prior to visit.       15 minutes   Follow Up   Return in about 6 weeks (around 7/8/2022) for Next scheduled follow up.      Patient understands the risks associated with this controlled medication, including tolerance and addiction.  she also agrees to only obtain this medication from me, and not from a another provider, unless that provider is covering for me in my absence.  she also agrees to be compliant in dosing, and not self adjust the dose of medication.  A signed controlled substance agreement is on file, and she has received a controlled substance education sheet at this a previous visit.  she has also signed a consent for treatment with a controlled substance as per [facility] policy. PRABHAKAR was obtained.    Refill meds as directed, monitor weight -monitor calorie intake, no sugar , no fast foods      meds as directed, diet discussed

## 2022-07-08 ENCOUNTER — OFFICE VISIT (OUTPATIENT)
Dept: FAMILY MEDICINE CLINIC | Facility: CLINIC | Age: 37
End: 2022-07-08

## 2022-07-08 VITALS
OXYGEN SATURATION: 98 % | HEART RATE: 83 BPM | BODY MASS INDEX: 26.52 KG/M2 | HEIGHT: 68 IN | DIASTOLIC BLOOD PRESSURE: 80 MMHG | SYSTOLIC BLOOD PRESSURE: 120 MMHG | WEIGHT: 175 LBS

## 2022-07-08 DIAGNOSIS — E66.3 OVERWEIGHT (BMI 25.0-29.9): ICD-10-CM

## 2022-07-08 DIAGNOSIS — R53.81 MALAISE AND FATIGUE: Primary | ICD-10-CM

## 2022-07-08 DIAGNOSIS — R53.83 MALAISE AND FATIGUE: Primary | ICD-10-CM

## 2022-07-08 PROCEDURE — 99213 OFFICE O/P EST LOW 20 MIN: CPT | Performed by: NURSE PRACTITIONER

## 2022-07-08 RX ORDER — PHENTERMINE HYDROCHLORIDE 37.5 MG/1
37.5 TABLET ORAL
Qty: 30 TABLET | Refills: 0 | Status: SHIPPED | OUTPATIENT
Start: 2022-07-08 | End: 2022-10-21

## 2022-07-08 NOTE — PROGRESS NOTES
Chief Complaint   Patient presents with   • Weight Check     6 week check up      Subjective   Dayana Bang is a 36 y.o. female.           Obesity  This is a recurrent problem. The current episode started 1 to 4 weeks ago. The problem occurs every several days. The problem has been gradually improving. Associated symptoms include fatigue. Pertinent negatives include no arthralgias, chest pain, chills, congestion, coughing, diaphoresis, fever, headaches, numbness, rash, sore throat, swollen glands, urinary symptoms, vertigo, visual change, vomiting or weakness. Treatments tried: eating less and exercising -phenteramine  The treatment provided no relief.   Fatigue  This is a recurrent problem. The current episode started 1 to 4 weeks ago. The problem occurs every several days. The problem has been waxing and waning. Associated symptoms include fatigue. Pertinent negatives include no arthralgias, chest pain, chills, congestion, coughing, diaphoresis, fever, headaches, numbness, rash, sore throat, swollen glands, urinary symptoms, vertigo, visual change, vomiting or weakness. She has tried rest for the symptoms. The treatment provided mild relief.        The following portions of the patient's history were reviewed and updated as appropriate: allergies, current medications, past social history and problem list.    Review of Systems   Constitutional: Positive for fatigue and unexpected weight change. Negative for activity change, appetite change, chills, diaphoresis and fever.        Meds helping with weight loss    HENT: Negative for congestion, dental problem, drooling, ear discharge, ear pain, facial swelling, hearing loss, mouth sores, nosebleeds, postnasal drip, rhinorrhea, sinus pain and sore throat.    Eyes: Negative.  Negative for photophobia, pain, discharge, redness, itching and visual disturbance.   Respiratory: Negative.  Negative for apnea, cough, choking, chest tightness, shortness of breath,  "wheezing and stridor.    Cardiovascular: Negative.  Negative for chest pain, palpitations and leg swelling.   Gastrointestinal: Negative.  Negative for abdominal distention, anal bleeding, blood in stool, constipation and vomiting.   Endocrine: Negative for cold intolerance, heat intolerance, polydipsia, polyphagia and polyuria.   Genitourinary: Negative.    Musculoskeletal: Negative for arthralgias, back pain and gait problem.   Skin: Negative.  Negative for rash.   Allergic/Immunologic: Negative.  Negative for environmental allergies, food allergies and immunocompromised state.   Neurological: Negative.  Negative for vertigo, tremors, seizures, syncope, speech difficulty, weakness, light-headedness, numbness and headaches.   Hematological: Negative.  Negative for adenopathy. Does not bruise/bleed easily.   Psychiatric/Behavioral: Positive for sleep disturbance. Negative for agitation, behavioral problems, confusion, dysphoric mood, hallucinations and self-injury. The patient is not hyperactive.        Objective   /80   Pulse 83   Ht 172.7 cm (68\")   Wt 79.4 kg (175 lb)   LMP 04/10/2018 (Exact Date)   SpO2 98%   BMI 26.61 kg/m²   Physical Exam  Vitals and nursing note reviewed.   Constitutional:       Appearance: Normal appearance.   HENT:      Head: Normocephalic and atraumatic.      Right Ear: Tympanic membrane normal. There is no impacted cerumen.      Left Ear: Tympanic membrane normal. There is no impacted cerumen.      Nose: No congestion or rhinorrhea.      Mouth/Throat:      Mouth: Mucous membranes are moist.      Pharynx: No oropharyngeal exudate or posterior oropharyngeal erythema.   Eyes:      General: No scleral icterus.        Right eye: No discharge.         Left eye: No discharge.      Pupils: Pupils are equal, round, and reactive to light.   Cardiovascular:      Rate and Rhythm: Normal rate and regular rhythm.      Pulses: Normal pulses.      Heart sounds: No murmur heard.    No friction " rub. No gallop.   Pulmonary:      Effort: Pulmonary effort is normal. No respiratory distress.      Breath sounds: Normal breath sounds. No stridor. No wheezing, rhonchi or rales.   Chest:      Chest wall: No tenderness.   Abdominal:      General: Abdomen is flat. There is no distension.      Palpations: Abdomen is soft. There is no mass.      Tenderness: There is no abdominal tenderness. There is no right CVA tenderness, left CVA tenderness, guarding or rebound.      Hernia: No hernia is present.   Musculoskeletal:      Cervical back: Normal range of motion.   Skin:     General: Skin is warm.      Coloration: Skin is not jaundiced or pale.      Findings: No bruising, erythema, lesion or rash.   Neurological:      General: No focal deficit present.      Mental Status: She is alert and oriented to person, place, and time.      Cranial Nerves: No cranial nerve deficit.      Sensory: No sensory deficit.      Motor: No weakness.      Coordination: Coordination normal.      Gait: Gait normal.      Deep Tendon Reflexes: Reflexes normal.   Psychiatric:         Mood and Affect: Mood normal.         Behavior: Behavior normal.              Assessment & Plan     Problems Addressed this Visit    None     Visit Diagnoses     Overweight (BMI 25.0-29.9)    -  Primary    Relevant Medications    phentermine (ADIPEX-P) 37.5 MG tablet    Malaise and fatigue          Diagnoses       Codes Comments    Overweight (BMI 25.0-29.9)    -  Primary ICD-10-CM: E66.3  ICD-9-CM: 278.02     Malaise and fatigue     ICD-10-CM: R53.81, R53.83  ICD-9-CM: 780.79            New Medications Ordered This Visit   Medications   • phentermine (ADIPEX-P) 37.5 MG tablet     Sig: Take 1 tablet by mouth Every Morning Before Breakfast.     Dispense:  30 tablet     Refill:  0     Current Outpatient Medications on File Prior to Visit   Medication Sig Dispense Refill   • [DISCONTINUED] phentermine (ADIPEX-P) 37.5 MG tablet Take 1 tablet by mouth Every Morning Before  Breakfast. 30 tablet 0     No current facility-administered medications on file prior to visit.       15 minutes   Follow Up   Return in about 3 months (around 10/8/2022) for Next scheduled follow up.        It's not just what you eat, but when you eat  Eat breakfast, and eat smaller meals throughout the day. A healthy breakfast can jumpstart your metabolism, while eating small, healthy meals (rather than the standard three large meals) keeps your energy up.   Avoid eating at night. Try to eat dinner earlier and fast for 14-16 hours until breakfast the next morning. Studies suggest that eating only when you’re most active and giving your digestive system a long break each day may help to regulate weight.     Patient understands the risks associated with this controlled medication, including tolerance and addiction.  she also agrees to only obtain this medication from me, and not from a another provider, unless that provider is covering for me in my absence.  she also agrees to be compliant in dosing, and not self adjust the dose of medication.  A signed controlled substance agreement is on file, and she has received a controlled substance education sheet at this a previous visit.  she has also signed a consent for treatment with a controlled substance as per [facility] policy. PRABHAKAR was obtained.      Monitor blood pressure and heart rate -ekg and labs utd  See back in 3 months-monitor bp and heart rate

## 2022-10-21 ENCOUNTER — OFFICE VISIT (OUTPATIENT)
Dept: FAMILY MEDICINE CLINIC | Facility: CLINIC | Age: 37
End: 2022-10-21

## 2022-10-21 VITALS
SYSTOLIC BLOOD PRESSURE: 110 MMHG | WEIGHT: 172 LBS | DIASTOLIC BLOOD PRESSURE: 82 MMHG | OXYGEN SATURATION: 98 % | HEART RATE: 82 BPM | HEIGHT: 68 IN | BODY MASS INDEX: 26.07 KG/M2

## 2022-10-21 DIAGNOSIS — R53.83 MALAISE AND FATIGUE: Primary | ICD-10-CM

## 2022-10-21 DIAGNOSIS — H61.92 SKIN LESION OF LEFT EAR: ICD-10-CM

## 2022-10-21 DIAGNOSIS — R53.81 MALAISE AND FATIGUE: Primary | ICD-10-CM

## 2022-10-21 DIAGNOSIS — L98.7 EXCESSIVE SKIN AND SUBCUTANEOUS TISSUE: ICD-10-CM

## 2022-10-21 PROCEDURE — 99213 OFFICE O/P EST LOW 20 MIN: CPT | Performed by: NURSE PRACTITIONER

## 2022-10-21 NOTE — PROGRESS NOTES
Chief Complaint   Patient presents with   • Fatigue     Weight check     Subjective   Dayanashayan Bang is a 37 y.o. female.           Fatigue  This is a recurrent problem. The current episode started 1 to 4 weeks ago. The problem occurs every several days. The problem has been waxing and waning. Associated symptoms include fatigue. Pertinent negatives include no abdominal pain, anorexia, arthralgias, chest pain, chills, congestion, coughing, diaphoresis, fever, headaches, joint swelling, myalgias, nausea, neck pain, numbness, rash, sore throat, swollen glands, urinary symptoms, vertigo, visual change, vomiting or weakness. She has tried rest for the symptoms. The treatment provided mild relief.   Obesity  This is a recurrent problem. The current episode started 1 to 4 weeks ago. The problem occurs every several days. The problem has been gradually improving. Associated symptoms include fatigue. Pertinent negatives include no abdominal pain, anorexia, arthralgias, chest pain, chills, congestion, coughing, diaphoresis, fever, headaches, joint swelling, myalgias, nausea, neck pain, numbness, rash, sore throat, swollen glands, urinary symptoms, vertigo, visual change, vomiting or weakness. Treatments tried: eating less and exercising -phenteramine  The treatment provided no relief.   Rash  This is a recurrent problem. The current episode started more than 1 month ago. The problem has been gradually worsening since onset. The affected locations include the left ear. Associated symptoms include fatigue. Pertinent negatives include no anorexia, congestion, cough, diarrhea, eye pain, fever, joint pain, nail changes, rhinorrhea, shortness of breath, sore throat or vomiting. The treatment provided mild relief. There is no history of allergies, asthma, eczema or varicella.   Earache   There is pain in the left ear. This is a chronic problem. The problem has been unchanged. There has been no fever. The patient is  experiencing no pain. Pertinent negatives include no abdominal pain, coughing, diarrhea, ear discharge, headaches, hearing loss, neck pain, rash, rhinorrhea, sore throat or vomiting.        The following portions of the patient's history were reviewed and updated as appropriate: allergies, current medications, past social history and problem list.    Review of Systems   Constitutional: Positive for fatigue and unexpected weight change. Negative for activity change, appetite change, chills, diaphoresis and fever.        Meds helping with weight loss    HENT: Negative for congestion, dental problem, drooling, ear discharge, ear pain, facial swelling, hearing loss, mouth sores, nosebleeds, postnasal drip, rhinorrhea, sinus pressure, sinus pain, sneezing, sore throat, tinnitus, trouble swallowing and voice change.         Growing changing lesion left earlobe    Eyes: Negative.  Negative for photophobia, pain, discharge, redness, itching and visual disturbance.   Respiratory: Negative.  Negative for apnea, cough, choking, chest tightness, shortness of breath, wheezing and stridor.    Cardiovascular: Negative.  Negative for chest pain, palpitations and leg swelling.   Gastrointestinal: Negative.  Negative for abdominal distention, abdominal pain, anal bleeding, anorexia, blood in stool, constipation, diarrhea, nausea, rectal pain and vomiting.   Endocrine: Negative for cold intolerance, heat intolerance, polydipsia, polyphagia and polyuria.   Genitourinary: Negative.  Negative for difficulty urinating, dyspareunia and dysuria.   Musculoskeletal: Negative for arthralgias, back pain, gait problem, joint pain, joint swelling, myalgias, neck pain and neck stiffness.   Skin: Positive for wound. Negative for color change, nail changes, pallor and rash.        Excessive skin lower abdomen -with rash on and off    Allergic/Immunologic: Negative.  Negative for environmental allergies, food allergies and immunocompromised state.  "  Neurological: Negative.  Negative for dizziness, vertigo, tremors, seizures, syncope, facial asymmetry, speech difficulty, weakness, light-headedness, numbness and headaches.   Hematological: Negative.  Negative for adenopathy. Does not bruise/bleed easily.   Psychiatric/Behavioral: Positive for sleep disturbance. Negative for agitation, behavioral problems, confusion, decreased concentration, dysphoric mood, hallucinations, self-injury and suicidal ideas. The patient is not nervous/anxious and is not hyperactive.        Objective   /82   Pulse 82   Ht 172.7 cm (68\")   Wt 78 kg (172 lb)   LMP 04/10/2018 (Exact Date)   SpO2 98%   BMI 26.15 kg/m²   Physical Exam  Vitals and nursing note reviewed.   Constitutional:       General: She is not in acute distress.     Appearance: Normal appearance. She is not ill-appearing, toxic-appearing or diaphoretic.   HENT:      Head: Normocephalic and atraumatic.      Right Ear: Tympanic membrane normal. There is no impacted cerumen.      Left Ear: Tympanic membrane normal. There is no impacted cerumen.      Ears:      Comments: Skin lesion left earlobe        Nose: No congestion or rhinorrhea.      Mouth/Throat:      Mouth: Mucous membranes are dry.      Pharynx: No oropharyngeal exudate or posterior oropharyngeal erythema.   Eyes:      General: No scleral icterus.        Right eye: No discharge.         Left eye: No discharge.      Pupils: Pupils are equal, round, and reactive to light.   Neck:      Vascular: No carotid bruit.   Cardiovascular:      Rate and Rhythm: Normal rate and regular rhythm.      Pulses: Normal pulses.      Heart sounds: No murmur heard.    No friction rub. No gallop.   Pulmonary:      Effort: Pulmonary effort is normal. No respiratory distress.      Breath sounds: Normal breath sounds. No stridor. No wheezing, rhonchi or rales.   Chest:      Chest wall: No tenderness.   Abdominal:      General: Abdomen is flat. There is no distension.      " Palpations: Abdomen is soft. There is no mass.      Tenderness: There is no abdominal tenderness. There is no right CVA tenderness, left CVA tenderness, guarding or rebound.      Hernia: No hernia is present.   Musculoskeletal:         General: No swelling, tenderness, deformity or signs of injury. Normal range of motion.      Cervical back: Normal range of motion. No rigidity or tenderness.      Right lower leg: No edema.      Left lower leg: No edema.   Lymphadenopathy:      Cervical: No cervical adenopathy.   Skin:     General: Skin is warm.      Coloration: Skin is not jaundiced or pale.      Findings: Lesion present. No bruising, erythema or rash.      Comments: Excessive skin lower abdomen from recent weight loss   Dry skin and rash to lower abdomen from excessive skin    Neurological:      General: No focal deficit present.      Mental Status: She is alert and oriented to person, place, and time.      Cranial Nerves: No cranial nerve deficit.      Sensory: No sensory deficit.      Motor: No weakness.      Coordination: Coordination normal.      Gait: Gait normal.      Deep Tendon Reflexes: Reflexes normal.   Psychiatric:         Mood and Affect: Mood normal.         Behavior: Behavior normal.              Assessment & Plan     Problems Addressed this Visit    None  Visit Diagnoses     Malaise and fatigue    -  Primary    Relevant Orders    CBC & Differential    Comprehensive Metabolic Panel    Hemoglobin A1c    TSH    Vitamin B12    Vitamin D,25-Hydroxy    Excessive skin and subcutaneous tissue        Relevant Orders    Ambulatory Referral to Plastic Surgery (Completed)    Skin lesion of left ear        Relevant Orders    Ambulatory Referral to ENT (Otolaryngology)      Diagnoses       Codes Comments    Malaise and fatigue    -  Primary ICD-10-CM: R53.81, R53.83  ICD-9-CM: 780.79     Excessive skin and subcutaneous tissue     ICD-10-CM: L98.7  ICD-9-CM: 701.9     Skin lesion of left ear     ICD-10-CM:  H61.92  ICD-9-CM: 380.9          No orders of the defined types were placed in this encounter.    Current Outpatient Medications on File Prior to Visit   Medication Sig Dispense Refill   • [DISCONTINUED] phentermine (ADIPEX-P) 37.5 MG tablet Take 1 tablet by mouth Every Morning Before Breakfast. 30 tablet 0     No current facility-administered medications on file prior to visit.       15 minutes   Follow Up   Return for Next scheduled follow up.        Patient understands the risks associated with this controlled medication, including tolerance and addiction.  she also agrees to only obtain this medication from me, and not from a another provider, unless that provider is covering for me in my absence.  she also agrees to be compliant in dosing, and not self adjust the dose of medication.  A signed controlled substance agreement is on file, and she has received a controlled substance education sheet at this a previous visit.  she has also signed a consent for treatment with a controlled substance as per [facility] policy. PRABHAKAR was obtained.      Refer to plastic surgery for consult for excessive abdominal skin     Refer to ENT for growing skin lesion   Continue other meds, no changes otherwise

## 2022-11-04 ENCOUNTER — OFFICE VISIT (OUTPATIENT)
Dept: OTOLARYNGOLOGY | Facility: CLINIC | Age: 37
End: 2022-11-04

## 2022-11-04 VITALS — WEIGHT: 173 LBS | TEMPERATURE: 97.4 F | HEIGHT: 68 IN | BODY MASS INDEX: 26.22 KG/M2

## 2022-11-04 DIAGNOSIS — H61.92 LESION OF EXTERNAL EAR, LEFT: Primary | ICD-10-CM

## 2022-11-04 DIAGNOSIS — H61.22 IMPACTED CERUMEN OF LEFT EAR: ICD-10-CM

## 2022-11-04 DIAGNOSIS — H72.2X2 TYMPANIC MEMBRANE PERFORATION, MARGINAL, LEFT: ICD-10-CM

## 2022-11-04 PROCEDURE — 69210 REMOVE IMPACTED EAR WAX UNI: CPT | Performed by: OTOLARYNGOLOGY

## 2022-11-04 PROCEDURE — 99204 OFFICE O/P NEW MOD 45 MIN: CPT | Performed by: OTOLARYNGOLOGY

## 2022-11-04 NOTE — PROGRESS NOTES
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, persistent left perforation, and enlarging left ear lesion    -Regarding her left ear vascular lesion of the external pinna, we discussed that I believe this is a benign vascular lesion, and as such they can be responsive to hormonal changes, we discussed the risks, benefits, and alternatives to excision in the clinic under local anesthesia including anticipatory management with leaving the lesion in place, patient preference is undergo excision, the risks of that procedure include recurrence, change to the appearance of the helix, bleeding, infection, need for local wound care for 1 to 2 weeks following the procedure, and the potential for further interventions pending pathology.  All questions regarding this procedure were answered at today's visit.    -Regarding her large marginal perforation on the left, we discussed that there are also options for the treatment of this problem.  The first option would be to do nothing and continue to keep the ear dry to prevent infections.  The next option would be to obtain a hearing aid to restore hearing on that side as this is moderately bothersome to her so far.  She would still need to maintain ear precautions in this case.  The next option would be an additional revision surgery to attempt to repair the eardrum, she is not interested in pursuing this at this time but will call if she does change her mind.    -Regarding her cerumen impaction we discussed avoidance of Q-tip use and other things within the ear canal.  She can use baby oil 3 to 4 drops 1 time weekly to help prevent cerumen impaction.    History of present illness:    Ms. Bang is a 37-year-old female with a past medical history including traumatic left tympanic membrane perforation status post multiple attempted repairs, now with  December 9, 2020       Greg Daly MD  [address]  Second Floor  [facility] [address]  Via In Basket      Patient: Amaris Lou   YOB: 1968   Date of Visit: 12/9/2020       Dear Dr. Daly:    I saw your patient, Amaris Lou, for an evaluation. Below are my notes for this visit with her.    If you have questions, please do not hesitate to call me.      Sincerely,        Suzette Elizabeth MD        CC: No Recipients  Suzette Elizabeth MD  12/9/2020 11:26 AM  Signed  Endocrinology New Consultation     CC: diabetes  Referring provider: Greg Daly MD    HPI:    Amaris Lou is a 52 year old female  seen in consultation at the request of Greg Daly MD  for evaluation of diabetes, type 2.   Patient of Dr. Mackenzie.     Diabetes is sub optimally controlled.   diagnosed with type 2 diabetes 4 years ago.    Over the past 3 months DM control has gotten worse  No hypoglycemia  Last ophtho appointment 11/2020 - no hx of laser treatment     BG meter reviewed  Checking three times per day   Over past week:  Range 135 - 160. Isolated reading at 220 this morning.     Current DM regimen:  Lantus 50U qAM, 55U qHS   Novolog 25U TID (with meals) -- sometimes forgets to take, forgot last night.   Victoza 1.8 mg daily -- started 3 weeks ago, has not noted much improvement yet     No personal hx of pancreatitis. No personal or known fam hx of MTC/MEN. Does not know paternal medical history.     Dm better controlled when on metformin  But then stopped due to diarrhea severe  She is nervous about trying the extended release     Follows with podiatrist. Occasionally gets cold toes.     Full ROS completed this visit:  Positive for nausea.   Negative for weight gain, weight loss, fevers, chills, blurry vision, changes in vision, chest pain, palpitations, leg swelling, shortness of breath, wheezing, snoring, abdominal pain, vomiting, diarrhea, constipation, frequent urination, dysuria, tremors, fainting,  persistent hearing loss and perforation on the left.  She does state that she uses Q-tips within both the ears and has had issues with cerumen impaction in the past.  She also states that her hearing has been somewhat worse recently.  Regarding the ear lesion for which she is being seen today, she notes that the area first arose while she was pregnant about 3 years ago, it remained stable for a period of time but recently has started growing slightly.  She states that the area is not painful or tender, does not bleed or drain, has never been infected.  She is unhappy with his appearance and notices it more, it does catch on her hair at times her on her mask loops which is bothersome.  No other acute concerns today.    Vital Signs   Vitals:    11/04/22 1326   Temp: 97.4 °F (36.3 °C)     Physical Exam:  General: NAD, awake and alert  Head: normocephalic, atraumatic  Eyes: EOMI, sclerae white, conjunctivae pink  Ears: pinnae intact without masses or lesions on the right, on the left just past the helical root on the helical rim, there is a multilobulated, fluctuant, soft, mobile mass that is purple in color and approximately 5 mm in size consistent with a hemangioma, or other vascular benign tumor   Right: TM intact without injection or effusion   Left: 100% hard large cerumen impaction, removed, tympanic membrane thickened, with a marginal 80% perforation anteriorly, no otorrhea  Nose: external straight without deformity  OC/OP: mucosa moist and pink, no masses or lesions  Neuro:  no focal deficits    Procedure: Removal of impacted cerumen, left  Indication: impacted cerumen, left  EBL: None  Anesthesia: None  Complications: None  Surgeon: Farida Mo MD    Description:  After informed consent was verbally obtained from the patient, they are placed in a reclined position with the head turned to the contralateral side.  Using a binocular microscope and a 4 mm ear speculum Left canal was cleared of wax using suction  shakes, cold intolerance, hot intolerance, depression, anxiety, foot sores, rash.    Medications  Current Outpatient Medications   Medication Sig Dispense Refill   • Victoza 18 MG/3ML pen-injector Inject 1.8 mg into the skin daily. 27 mL 2   • semaglutide,0.25 or 0.5 mg/DOSE, (Ozempic, 0.25 or 0.5 MG/DOSE,) (1.34 mg/ml) 0.25 or 0.5 MG/DOSE injection Inject 0.25 mg weekly for 2 weeks then increase to 0.5 mg weekly 1.5 mL 1   • insulin glargine (Lantus) 100 UNIT/ML vial solution every 12 hours.     • tamsulosin (FLOMAX) 0.4 MG Cap TK 1 C PO D PC     • FeroSul 325 (65 Fe) MG tablet TAKE 1 TABLET BY MOUTH EVERY DAY 90 tablet 3   • Blood Glucose Monitoring Suppl (ONE TOUCH ULTRA 2) w/Device Kit Use  daily for home blood sugar testing 1 kit 0   • amoxicillin-clavulanate (AUGMENTIN) 875-125 MG per tablet Take 1 tablet by mouth 2 times daily. 20 tablet 0   • ondansetron (ZOFRAN) 4 MG tablet Take 1 tablet by mouth every 8 hours as needed for Nausea. 20 tablet 3   • HumaLOG KwikPen 100 UNIT/ML pen-injector Inject 25 Units into the skin 3 times daily (before meals). Prime 2 units before each dose. 67.5 mL 1   • flecainide (TAMBOCOR) 100 MG tablet TAKE 1 TABLET BY MOUTH TWICE DAILY 180 tablet 0   • blood glucose (ONE TOUCH ULTRA TEST) test strip Test blood sugar 3 times daily as directed. Diagnosis: E11.9. Meter: One touch 300 strip 3   • Lancets Ultra Fine Misc Test three times daily as directed 300 each 3   • budesonide-formoterol (SYMBICORT) 160-4.5 MCG/ACT inhaler Inhale 2 puffs into the lungs 2 times daily. 10.2 g 12   • montelukast (SINGULAIR) 10 MG tablet Take 1 tablet by mouth daily. 90 tablet 3   • hydrochlorothiazide (HYDRODIURIL) 25 MG tablet Take 1 tablet by mouth daily. 90 tablet 1   • losartan (COZAAR) 100 MG tablet Take 1 tablet by mouth daily. 90 tablet 3   • metoPROLOL succinate (TOPROL-XL) 25 MG 24 hr tablet Take 1 tablet by mouth daily. 90 tablet 3   • atorvastatin (LIPITOR) 20 MG tablet Take 1 tablet by  and wire loop. The patient tolerated the procedure well and without known complications.  This concluded the procedure.    Results Review:  Primary APRN note from 10/21/2022 reviewed today and demonstrates complaint of growing lesion of left earlobe, exam notes lesion of left earlobe no further description is provided.  Of note ear exam at that visit demonstrates no evidence of cerumen impaction bilaterally and normal tympanic membranes.    Review of Systems:  Positive ROS items: None noted  Otherwise, a 14 system ROS is negative except as pertinent positives are mentioned above.    Histories:  Allergies   Allergen Reactions   • Ceclor [Cefaclor]    • Erythromycin    • Penicillins    • Vancomycin        Prior to Admission medications    Not on File       Past Medical History:   Diagnosis Date   • Anxiety    • Otalgia    • URI (upper respiratory infection)        Past Surgical History:   Procedure Laterality Date   • ABDOMINAL SURGERY     • BREAST AUGMENTATION     •  SECTION     • HYSTERECTOMY     • INNER EAR SURGERY      ruptured ear drum x5   • KNEE ARTHROSCOPY W/ LATERAL RELEASE Left 2017   • WISDOM TOOTH EXTRACTION      4       Social History     Socioeconomic History   • Marital status: Single   Tobacco Use   • Smoking status: Never     Passive exposure: Past   • Smokeless tobacco: Never   • Tobacco comments:     For 18 years as a child   Vaping Use   • Vaping Use: Never used   Substance and Sexual Activity   • Alcohol use: Yes     Comment: socially   • Drug use: Never   • Sexual activity: Defer       Family History   Problem Relation Age of Onset   • Anxiety disorder Mother    • Hypertension Father    • Asthma Father    • Diabetes Father    • COPD Father    • Thyroid disease Sister    • Heart attack Maternal Grandmother    • No Known Problems Daughter    • No Known Problems Daughter    • Epilepsy Son    • Cerebral palsy Son              This document has been electronically signed by Farida Mo  mouth at bedtime. 90 tablet 3   • apixaBAN (ELIQUIS) 5 MG Tab Take 1 tablet by mouth every 12 hours. 180 tablet 3   • albuterol (PROAIR RESPICLICK) 108 (90 Base) MCG/ACT inhaler Inhale 1 puff into the lungs every 4 hours as needed for Shortness of Breath or Wheezing. 1 Inhaler 6   • Insulin Pen Needle (B-D U/F PEN NEEDLE 5/16\") 31G X 8 MM Misc Use to inject insulin five times daily. Remove needle cover(s) to expose needle before injecting. 500 each 11   • Miconazole Nitrate (MONISTAT 3) 4 % Cream Apply nightly as directed 1 Tube 3   • nystatin (MYCOSTATIN) 185585 UNIT/GM powder Apply topically 3 times daily. 30 g 1   • azelastine (ASTELIN) 0.1 % nasal spray Spray 1 spray in each nostril 2 times daily. 1 Bottle 6   • fluticasone (FLONASE) 50 MCG/ACT nasal spray Spray 1 spray in each nostril daily. 16 g 11   • fluticasone-salmeterol (ADVAIR DISKUS) 500-50 MCG/DOSE inhaler Inhale 2 puffs into the lungs every 12 hours. 60 each 3   • valACYclovir (VALTREX) 500 MG tablet 2000 mg q 12 hours x 1 day 8 tablet 1   • acyclovir (ZOVIRAX) 5 % ointment Apply topically every 3 hours. 15 g 0   • Vitamin D, Ergocalciferol, 82985 units capsule Take 1 capsule by mouth 1 day a week. 8 capsule 0   • oxygen (O2) gas      • 1ST CHOICE LANCETS SUPER THIN Misc Test four times daily as directed 100 each 11   • prednisoLONE acetate (PRED FORTE, OMNIPRED) 1 % ophthalmic suspension Apply to eye as needed.        No current facility-administered medications for this visit.      Histories:  Past Medical History:   Diagnosis Date   • AF (atrial fibrillation)    • Asthma    • Atrial flutter    • CKD (chronic kidney disease)    • DM (diabetes mellitus)    • HSV (herpes simplex virus) infection    • HTN (hypertension)    • Morbid obesity    • Nephrolithiasis    • OAB (overactive bladder)    • JD (obstructive sleep apnea)    • Palpitations    • SOB (shortness of breath)      Past Surgical History:   Procedure Laterality Date   • Cardioversion N/A  03/09/2016   • Gallbladder surgery     • Total abdominal hysterectomy w/ bilateral salpingoophorectomy       Family History   Problem Relation Age of Onset   • COPD Mother    • Diabetes Mother    • Cancer, Breast Sister    • Bleeding Disorder Sister         VWD (von Willebrand disease)   • Neurological Disorder Sister         Fibromyalgia   • Systemic Lupus Erythematosus Other         Uncle, Cousin     Social History     Tobacco Use   • Smoking status: Never Smoker   • Smokeless tobacco: Never Used   Substance Use Topics   • Alcohol use: Not Currently     Frequency: Never     Comment: occasionally   • Drug use: Never     Physical examination  Visit Vitals  /64 (BP Location: RUE - Right upper extremity, Patient Position: Sitting, Cuff Size: Large Adult)   Pulse 73   Temp 97.7 °F (36.5 °C) (Temporal)   SpO2 93%        GENERAL: alert and oriented, no acute distress. Obesity present.   HENT: atraumatic, hearing grossly normal   EYES: No lid lag, proptosis, icterus, conjunctival injection, periorbital edema.   NECK: supple, ROM normal   CARDIO: Regular rate, no LE edema   RESP: Breathing comfortably. No use of accessory muscles.   NEUROLOGIC: No tremor. Speech coherent, no dysarthria.   PSYCHIATRIC: cooperative. appropriate mood and affect.  SKIN: Normal temperature, no abdominal striae.   FEET: no foot sores, sensate to 10g monofilament bilaterally     Labs:  Hemoglobin A1C (%)   Date Value   09/11/2020 8.2 (H)     GFR Estimate, Non  (no units)   Date Value   06/29/2020 >90     MICROALBUMIN/CREATININE (mg/g)   Date Value   10/28/2020 8.8     No results found for: MASON MUKHERJEE  TSH (mcunit/mL)   Date Value   03/08/2016 4.892     CALCIUM (mg/dL)   Date Value   06/29/2020 9.0     CALCULATED LDL (mg/dL)   Date Value   09/11/2020 87     HDL (mg/dL)   Date Value   09/11/2020 35 (L)     TRIGLYCERIDE (mg/dL)   Date Value   09/11/2020 337 (H)     AST/SGOT (Units/L)   Date Value   06/29/2020 51 (H)  MD on November 4, 2022 14:39 CDT           ALT/SGPT (Units/L)   Date Value   06/29/2020 21     A/P:    Diabetes Mellitus Type 2 with hyperglycemia on insulin.   Last A1C 9.2%   C/b severe obesity     Trial metformin  mg once a day  Continue Victoza 1.8 mg daily   Continue Lantus 50U qAM, 55U qHS   Continue Novolog 25U TID (with meals) -- sometimes forgets to take, forgot last night.     Reminded pt on A1C and blood sugar targets (Fasting < 130 and post prandial <180 ) and complications associated with hyperglycemia and uncontrolled DM  Recommended SMBG 3 daily if not using CGM  Reinforced timing and adherence with medication, self-monitoring of blood glucose and routine follow up   Reviewed hypoglycemia signs/symptoms, treatment using the Rule of 15'   Patient has been asked to contact either myself or PCP if glucose levels less than 70 or persistently greater than 250 prior to next follow up visit.     Complexity of decision making is high for review of records, tests, labs and independent visualization and interpretation of glucose values and meter download, control not optimal with need for further medication management. Risk is high due to insulin use and the potential for hyper and hypoglycemia.    Screening  Retinopathy  - No known hx of retinopathy. Advised annual dilated eye exam or earlier if specified by ophthalmologist.   Nephropathy - urine micro alb normal  Neuropathy - Normal 10g monofilament today. No sores, following w/podiatry.     Recurrent nephrolithiasis  Normal Ca level  Check parathyroid hormone level     Thank you for allowing me to participate in the care of this patient.     Follow up - 3 months

## 2022-12-02 ENCOUNTER — PROCEDURE VISIT (OUTPATIENT)
Dept: OTOLARYNGOLOGY | Facility: CLINIC | Age: 37
End: 2022-12-02

## 2022-12-02 VITALS — BODY MASS INDEX: 26.22 KG/M2 | OXYGEN SATURATION: 99 % | HEART RATE: 103 BPM | WEIGHT: 173 LBS | HEIGHT: 68 IN

## 2022-12-02 DIAGNOSIS — H61.92 LESION OF EXTERNAL EAR, LEFT: Primary | ICD-10-CM

## 2022-12-02 PROCEDURE — 11441 EXC FACE-MM B9+MARG 0.6-1 CM: CPT | Performed by: OTOLARYNGOLOGY

## 2022-12-02 NOTE — PROGRESS NOTES
Procedure: excision of left ear lesion  Indication: left helical root hemangioma, bothersome for sunglasses, ear loop masks  Specimen: left ear lesion  EBL: <1mL  Surgeon: Farida Mo MD  Anesthesia: local  Complications: none    Findings: 0.8cmx0.8cm left helical root mass, excised    Description:  After informed consent was obtained and documented in the chart, patient was laid in a reclined position with the head rotated to the contralateral side.  The area was locally anesthetized with 2% lidocaine with 1 100,000 epinephrine.  Incision was made overlying the mass with a 15 blade and combination of sharp and blunt dissection with scissors used to free the hemangioma from the underlying tissues, there was a small ellipse of skin that was excised due to its thinness and likely will be unviable after excision.  These were sent together for pathology.  Silver nitrate cautery was used to achieve hemostasis.  The area was then gently cleansed.  Simple.  4-0 Monocryl was used to close dermis and a running locking 5-0 fast gut was used to close the skin in a tensionless fashion.  The patient tolerated infusion well and without known complication.  This concluded procedure.    Disposition:  Wound care instructions provided  Red flags for infection or wound healing provided  Follow-up as needed

## 2022-12-08 LAB — REF LAB TEST METHOD: NORMAL

## 2023-03-27 ENCOUNTER — TELEPHONE (OUTPATIENT)
Dept: FAMILY MEDICINE CLINIC | Facility: CLINIC | Age: 38
End: 2023-03-27

## 2023-03-27 NOTE — TELEPHONE ENCOUNTER
Dr Gutierrez's office (plastic surgeon) called and said that you had referred patient to them. She no-showed 2 times and cancelled appt 1 time. They are going to close the referral.

## 2023-05-12 ENCOUNTER — LAB (OUTPATIENT)
Dept: LAB | Facility: HOSPITAL | Age: 38
End: 2023-05-12
Payer: COMMERCIAL

## 2023-05-12 ENCOUNTER — OFFICE VISIT (OUTPATIENT)
Dept: FAMILY MEDICINE CLINIC | Facility: CLINIC | Age: 38
End: 2023-05-12
Payer: COMMERCIAL

## 2023-05-12 VITALS
SYSTOLIC BLOOD PRESSURE: 112 MMHG | HEIGHT: 68 IN | HEART RATE: 62 BPM | BODY MASS INDEX: 27.43 KG/M2 | OXYGEN SATURATION: 98 % | WEIGHT: 181 LBS | DIASTOLIC BLOOD PRESSURE: 82 MMHG

## 2023-05-12 DIAGNOSIS — F51.01 PRIMARY INSOMNIA: ICD-10-CM

## 2023-05-12 DIAGNOSIS — R53.83 MALAISE AND FATIGUE: Primary | ICD-10-CM

## 2023-05-12 DIAGNOSIS — E66.3 OVERWEIGHT (BMI 25.0-29.9): ICD-10-CM

## 2023-05-12 DIAGNOSIS — R53.81 MALAISE AND FATIGUE: Primary | ICD-10-CM

## 2023-05-12 PROCEDURE — 84443 ASSAY THYROID STIM HORMONE: CPT | Performed by: NURSE PRACTITIONER

## 2023-05-12 PROCEDURE — 83540 ASSAY OF IRON: CPT | Performed by: NURSE PRACTITIONER

## 2023-05-12 PROCEDURE — 82607 VITAMIN B-12: CPT | Performed by: NURSE PRACTITIONER

## 2023-05-12 PROCEDURE — 83036 HEMOGLOBIN GLYCOSYLATED A1C: CPT | Performed by: NURSE PRACTITIONER

## 2023-05-12 PROCEDURE — 80053 COMPREHEN METABOLIC PANEL: CPT | Performed by: NURSE PRACTITIONER

## 2023-05-12 PROCEDURE — 85025 COMPLETE CBC W/AUTO DIFF WBC: CPT | Performed by: NURSE PRACTITIONER

## 2023-05-12 PROCEDURE — 82306 VITAMIN D 25 HYDROXY: CPT | Performed by: NURSE PRACTITIONER

## 2023-05-12 RX ORDER — HYDROXYZINE PAMOATE 25 MG/1
CAPSULE ORAL
Qty: 60 CAPSULE | Refills: 11 | Status: SHIPPED | OUTPATIENT
Start: 2023-05-12

## 2023-05-12 RX ORDER — PHENTERMINE HYDROCHLORIDE 37.5 MG/1
37.5 TABLET ORAL
Qty: 30 TABLET | Refills: 0 | Status: SHIPPED | OUTPATIENT
Start: 2023-05-12

## 2023-05-12 NOTE — PROGRESS NOTES
Chief Complaint   Patient presents with   • Tingling     Arms and finger  weight     Subjective   Dayana Macy Bang is a 37 y.o. female.           Fatigue  This is a recurrent problem. The current episode started 1 to 4 weeks ago. The problem occurs every several days. The problem has been waxing and waning. Associated symptoms include numbness. Pertinent negatives include no arthralgias, chest pain, chills, diaphoresis, joint swelling, myalgias, nausea, swollen glands, urinary symptoms, vertigo, visual change or weakness. She has tried rest for the symptoms. The treatment provided mild relief.   Obesity  This is a recurrent problem. The current episode started 1 to 4 weeks ago. The problem occurs every several days. The problem has been gradually worsening. Associated symptoms include numbness. Pertinent negatives include no arthralgias, chest pain, chills, diaphoresis, joint swelling, myalgias, nausea, swollen glands, urinary symptoms, vertigo, visual change or weakness. Treatments tried: eating less and exercising -phenteramine  The treatment provided no relief.   Insomnia  This is a recurrent problem. The current episode started more than 1 month ago. The problem occurs intermittently. The problem has been gradually worsening. Associated symptoms include numbness. Pertinent negatives include no arthralgias, chest pain, chills, diaphoresis, joint swelling, myalgias, nausea, swollen glands, urinary symptoms, vertigo, visual change or weakness. She has tried nothing for the symptoms. The treatment provided no relief.        The following portions of the patient's history were reviewed and updated as appropriate: allergies, current medications, past social history and problem list.    Review of Systems   Constitutional: Negative for activity change, appetite change, chills and diaphoresis.   HENT: Negative.  Negative for ear pain.    Eyes: Negative.    Respiratory: Negative.  Negative for wheezing.   "  Cardiovascular: Negative.  Negative for chest pain.   Gastrointestinal: Negative.  Negative for nausea.   Endocrine: Negative.  Negative for polydipsia, polyphagia and polyuria.   Genitourinary: Negative.    Musculoskeletal: Negative.  Negative for arthralgias, joint swelling and myalgias.   Allergic/Immunologic: Negative.    Neurological: Positive for dizziness and numbness. Negative for vertigo, seizures, speech difficulty and weakness.        Reports numbness both hands    Hematological: Negative.    Psychiatric/Behavioral: Positive for sleep disturbance. The patient is nervous/anxious and has insomnia.         Several new life stressors at this time        Objective   /82   Pulse 62   Ht 172.7 cm (68\")   Wt 82.1 kg (181 lb)   LMP 04/10/2018 (Exact Date)   SpO2 98%   BMI 27.52 kg/m²   Physical Exam  Vitals and nursing note reviewed.   Constitutional:       General: She is not in acute distress.     Appearance: Normal appearance. She is not ill-appearing, toxic-appearing or diaphoretic.   HENT:      Head: Normocephalic and atraumatic.      Right Ear: Tympanic membrane normal. There is no impacted cerumen.      Left Ear: Tympanic membrane normal. There is no impacted cerumen.      Nose: No congestion or rhinorrhea.      Mouth/Throat:      Mouth: Mucous membranes are dry.      Pharynx: No oropharyngeal exudate or posterior oropharyngeal erythema.   Eyes:      General: No scleral icterus.        Right eye: No discharge.         Left eye: No discharge.      Pupils: Pupils are equal, round, and reactive to light.   Neck:      Vascular: No carotid bruit.   Cardiovascular:      Rate and Rhythm: Normal rate and regular rhythm.      Pulses: Normal pulses.      Heart sounds: No murmur heard.    No friction rub. No gallop.   Pulmonary:      Effort: Pulmonary effort is normal. No respiratory distress.      Breath sounds: Normal breath sounds. No stridor. No wheezing, rhonchi or rales.   Chest:      Chest wall: " No tenderness.   Abdominal:      General: Abdomen is flat. There is no distension.      Palpations: Abdomen is soft. There is no mass.      Tenderness: There is no abdominal tenderness. There is no right CVA tenderness, left CVA tenderness, guarding or rebound.      Hernia: No hernia is present.   Musculoskeletal:         General: No swelling, tenderness, deformity or signs of injury. Normal range of motion.      Cervical back: Normal range of motion. No rigidity or tenderness.      Right lower leg: No edema.      Left lower leg: No edema.   Lymphadenopathy:      Cervical: No cervical adenopathy.   Skin:     General: Skin is warm.      Coloration: Skin is not jaundiced or pale.      Findings: No bruising, erythema, lesion or rash.   Neurological:      General: No focal deficit present.      Mental Status: She is alert and oriented to person, place, and time.      Cranial Nerves: No cranial nerve deficit.      Sensory: No sensory deficit.      Motor: No weakness.      Coordination: Coordination normal.      Gait: Gait normal.      Deep Tendon Reflexes: Reflexes normal.   Psychiatric:         Mood and Affect: Mood normal.         Behavior: Behavior normal.              Assessment & Plan     Problems Addressed this Visit    None  Visit Diagnoses     Malaise and fatigue    -  Primary    Relevant Orders    Iron (Completed)    Overweight (BMI 25.0-29.9)        Relevant Medications    phentermine (ADIPEX-P) 37.5 MG tablet    Primary insomnia          Diagnoses       Codes Comments    Malaise and fatigue    -  Primary ICD-10-CM: R53.81, R53.83  ICD-9-CM: 780.79     Overweight (BMI 25.0-29.9)     ICD-10-CM: E66.3  ICD-9-CM: 278.02     Primary insomnia     ICD-10-CM: F51.01  ICD-9-CM: 307.42            New Medications Ordered This Visit   Medications   • hydrOXYzine pamoate (Vistaril) 25 MG capsule     Si-2 tabs at night     Dispense:  60 capsule     Refill:  11   • phentermine (ADIPEX-P) 37.5 MG tablet     Sig: Take 1  tablet by mouth Every Morning Before Breakfast.     Dispense:  30 tablet     Refill:  0     No current outpatient medications on file prior to visit.     No current facility-administered medications on file prior to visit.       20 minutes   Follow Up   Return in about 4 weeks (around 6/9/2023).        Patient understands the risks associated with this controlled medication, including tolerance and addiction.  she also agrees to only obtain this medication from me, and not from a another provider, unless that provider is covering for me in my absence.  she also agrees to be compliant in dosing, and not self adjust the dose of medication.  A signed controlled substance agreement is on file, and she has received a controlled substance education sheet at this a previous visit.  she has also signed a consent for treatment with a controlled substance as per [facility] policy. PRABHAKAR was obtained.      meds as directed, add vistaril, diet discussed  Follow up as directed  prabhakar reviewed  Diet discussed

## 2023-05-13 LAB
25(OH)D3 SERPL-MCNC: 41 NG/ML (ref 30–100)
ALBUMIN SERPL-MCNC: 4.6 G/DL (ref 3.5–5.2)
ALBUMIN/GLOB SERPL: 1.8 G/DL
ALP SERPL-CCNC: 51 U/L (ref 39–117)
ALT SERPL W P-5'-P-CCNC: 14 U/L (ref 1–33)
ANION GAP SERPL CALCULATED.3IONS-SCNC: 10 MMOL/L (ref 5–15)
AST SERPL-CCNC: 17 U/L (ref 1–32)
BASOPHILS # BLD AUTO: 0.02 10*3/MM3 (ref 0–0.2)
BASOPHILS NFR BLD AUTO: 0.3 % (ref 0–1.5)
BILIRUB SERPL-MCNC: 0.3 MG/DL (ref 0–1.2)
BUN SERPL-MCNC: 12 MG/DL (ref 6–20)
BUN/CREAT SERPL: 16 (ref 7–25)
CALCIUM SPEC-SCNC: 9 MG/DL (ref 8.6–10.5)
CHLORIDE SERPL-SCNC: 105 MMOL/L (ref 98–107)
CO2 SERPL-SCNC: 24 MMOL/L (ref 22–29)
CREAT SERPL-MCNC: 0.75 MG/DL (ref 0.57–1)
DEPRECATED RDW RBC AUTO: 37.6 FL (ref 37–54)
EGFRCR SERPLBLD CKD-EPI 2021: 104 ML/MIN/1.73
EOSINOPHIL # BLD AUTO: 0.08 10*3/MM3 (ref 0–0.4)
EOSINOPHIL NFR BLD AUTO: 1.3 % (ref 0.3–6.2)
ERYTHROCYTE [DISTWIDTH] IN BLOOD BY AUTOMATED COUNT: 12.4 % (ref 12.3–15.4)
GLOBULIN UR ELPH-MCNC: 2.5 GM/DL
GLUCOSE SERPL-MCNC: 90 MG/DL (ref 65–99)
HBA1C MFR BLD: 5 % (ref 4.8–5.6)
HCT VFR BLD AUTO: 40.8 % (ref 34–46.6)
HGB BLD-MCNC: 13.9 G/DL (ref 12–15.9)
IMM GRANULOCYTES # BLD AUTO: 0.02 10*3/MM3 (ref 0–0.05)
IMM GRANULOCYTES NFR BLD AUTO: 0.3 % (ref 0–0.5)
IRON 24H UR-MRATE: 76 MCG/DL (ref 37–145)
LYMPHOCYTES # BLD AUTO: 2.13 10*3/MM3 (ref 0.7–3.1)
LYMPHOCYTES NFR BLD AUTO: 35.3 % (ref 19.6–45.3)
MCH RBC QN AUTO: 28.8 PG (ref 26.6–33)
MCHC RBC AUTO-ENTMCNC: 34.1 G/DL (ref 31.5–35.7)
MCV RBC AUTO: 84.6 FL (ref 79–97)
MONOCYTES # BLD AUTO: 0.28 10*3/MM3 (ref 0.1–0.9)
MONOCYTES NFR BLD AUTO: 4.6 % (ref 5–12)
NEUTROPHILS NFR BLD AUTO: 3.5 10*3/MM3 (ref 1.7–7)
NEUTROPHILS NFR BLD AUTO: 58.2 % (ref 42.7–76)
NRBC BLD AUTO-RTO: 0 /100 WBC (ref 0–0.2)
PLATELET # BLD AUTO: 222 10*3/MM3 (ref 140–450)
PMV BLD AUTO: 9.7 FL (ref 6–12)
POTASSIUM SERPL-SCNC: 4.2 MMOL/L (ref 3.5–5.2)
PROT SERPL-MCNC: 7.1 G/DL (ref 6–8.5)
RBC # BLD AUTO: 4.82 10*6/MM3 (ref 3.77–5.28)
SODIUM SERPL-SCNC: 139 MMOL/L (ref 136–145)
TSH SERPL DL<=0.05 MIU/L-ACNC: 1.46 UIU/ML (ref 0.27–4.2)
VIT B12 BLD-MCNC: 269 PG/ML (ref 211–946)
WBC NRBC COR # BLD: 6.03 10*3/MM3 (ref 3.4–10.8)

## 2023-05-15 ENCOUNTER — TELEPHONE (OUTPATIENT)
Dept: FAMILY MEDICINE CLINIC | Facility: CLINIC | Age: 38
End: 2023-05-15
Payer: COMMERCIAL

## 2023-05-15 NOTE — TELEPHONE ENCOUNTER
----- Message from ANDREI Mccarty sent at 5/13/2023  8:27 AM CDT -----  Regarding: FW:  Can you let her know she needs b12 5000 iu daily  ----- Message -----  From: Lab, Background User  Sent: 5/13/2023   1:05 AM CDT  To: ANDREI Mccarty

## 2023-05-15 NOTE — TELEPHONE ENCOUNTER
Per ANDREI Simon, Ms. Bang has been called with recent lab results & recommendations.  Continue current medications and follow-up as planned or sooner if any problems.     Aurora Aguillon APRN  Can you let her know she needs b12 5000 iu daily

## 2023-07-24 ENCOUNTER — OFFICE VISIT (OUTPATIENT)
Dept: FAMILY MEDICINE CLINIC | Facility: CLINIC | Age: 38
End: 2023-07-24
Payer: COMMERCIAL

## 2023-07-24 VITALS
DIASTOLIC BLOOD PRESSURE: 78 MMHG | SYSTOLIC BLOOD PRESSURE: 112 MMHG | WEIGHT: 183 LBS | OXYGEN SATURATION: 99 % | HEIGHT: 68 IN | HEART RATE: 79 BPM | BODY MASS INDEX: 27.74 KG/M2

## 2023-07-24 DIAGNOSIS — F41.9 ANXIETY: Primary | ICD-10-CM

## 2023-07-24 DIAGNOSIS — R53.81 MALAISE AND FATIGUE: ICD-10-CM

## 2023-07-24 DIAGNOSIS — R53.83 MALAISE AND FATIGUE: ICD-10-CM

## 2023-07-24 DIAGNOSIS — R41.840 CONCENTRATION DEFICIT: ICD-10-CM

## 2023-07-24 PROCEDURE — 1160F RVW MEDS BY RX/DR IN RCRD: CPT | Performed by: NURSE PRACTITIONER

## 2023-07-24 PROCEDURE — 99213 OFFICE O/P EST LOW 20 MIN: CPT | Performed by: NURSE PRACTITIONER

## 2023-07-24 PROCEDURE — 1159F MED LIST DOCD IN RCRD: CPT | Performed by: NURSE PRACTITIONER

## 2023-07-24 RX ORDER — BUPROPION HYDROCHLORIDE 75 MG/1
75 TABLET ORAL DAILY
Qty: 90 TABLET | Refills: 3 | Status: SHIPPED | OUTPATIENT
Start: 2023-07-24

## 2023-07-24 NOTE — PROGRESS NOTES
Chief Complaint   Patient presents with    Has symptoms ADD     Subjective   Dayana Macy Bang is a 37 y.o. female.           Fatigue  This is a recurrent problem. The current episode started 1 to 4 weeks ago. The problem occurs every several days. The problem has been waxing and waning. Associated symptoms include fatigue. Pertinent negatives include no headaches. She has tried rest for the symptoms. The treatment provided mild relief.   Anxiety  Presents for follow-up visit. Symptoms include decreased concentration, excessive worry, irritability and nervous/anxious behavior. Patient reports no shortness of breath. Symptoms occur most days. The most recent episode lasted 10 minutes. The severity of symptoms is moderate. The quality of sleep is good.     Compliance with medications is %.      The following portions of the patient's history were reviewed and updated as appropriate: allergies, current medications, past social history and problem list.    Review of Systems   Constitutional:  Positive for fatigue and irritability. Negative for activity change and appetite change.   HENT: Negative.  Negative for dental problem, drooling and ear pain.    Eyes: Negative.  Negative for photophobia, redness and visual disturbance.   Respiratory: Negative.  Negative for shortness of breath, wheezing and stridor.    Cardiovascular: Negative.    Gastrointestinal: Negative.    Endocrine: Negative.  Negative for polydipsia, polyphagia and polyuria.   Genitourinary: Negative.    Musculoskeletal: Negative.    Allergic/Immunologic: Negative.  Negative for food allergies and immunocompromised state.   Neurological:  Negative for syncope, facial asymmetry, light-headedness and headaches.        Reports numbness both hands    Hematological: Negative.    Psychiatric/Behavioral:  Positive for decreased concentration. The patient is nervous/anxious.         Hx of anxiety     Concern for decreased concentration      Objective  "  /78   Pulse 79   Ht 172.7 cm (68\")   Wt 83 kg (183 lb)   LMP 04/10/2018 (Exact Date)   SpO2 99%   BMI 27.83 kg/m²   Physical Exam  Vitals and nursing note reviewed.   Constitutional:       General: She is not in acute distress.     Appearance: Normal appearance. She is not ill-appearing, toxic-appearing or diaphoretic.   HENT:      Head: Normocephalic and atraumatic.      Right Ear: Tympanic membrane normal. There is no impacted cerumen.      Left Ear: Tympanic membrane normal. There is no impacted cerumen.      Nose: No congestion or rhinorrhea.      Mouth/Throat:      Mouth: Mucous membranes are dry.      Pharynx: No oropharyngeal exudate or posterior oropharyngeal erythema.   Eyes:      General: No scleral icterus.        Right eye: No discharge.         Left eye: No discharge.      Pupils: Pupils are equal, round, and reactive to light.   Neck:      Vascular: No carotid bruit.   Cardiovascular:      Rate and Rhythm: Normal rate and regular rhythm.      Pulses: Normal pulses.      Heart sounds: No murmur heard.    No friction rub. No gallop.   Pulmonary:      Effort: Pulmonary effort is normal. No respiratory distress.      Breath sounds: Normal breath sounds. No stridor. No wheezing, rhonchi or rales.   Chest:      Chest wall: No tenderness.   Abdominal:      General: Abdomen is flat. There is no distension.      Palpations: Abdomen is soft. There is no mass.      Tenderness: There is no abdominal tenderness. There is no right CVA tenderness, left CVA tenderness, guarding or rebound.      Hernia: No hernia is present.   Musculoskeletal:         General: No swelling, tenderness, deformity or signs of injury. Normal range of motion.      Cervical back: Normal range of motion. No rigidity or tenderness.      Right lower leg: No edema.      Left lower leg: No edema.   Lymphadenopathy:      Cervical: No cervical adenopathy.   Skin:     General: Skin is warm.      Coloration: Skin is not jaundiced or " pale.      Findings: No bruising, erythema, lesion or rash.   Neurological:      General: No focal deficit present.      Mental Status: She is alert and oriented to person, place, and time.      Cranial Nerves: No cranial nerve deficit.      Sensory: No sensory deficit.      Motor: No weakness.      Coordination: Coordination normal.      Gait: Gait normal.      Deep Tendon Reflexes: Reflexes normal.   Psychiatric:         Mood and Affect: Mood normal.         Behavior: Behavior normal.            Assessment & Plan     Problems Addressed this Visit          Mental Health    Anxiety - Primary     Other Visit Diagnoses       Malaise and fatigue        Concentration deficit        Relevant Orders    Ambulatory Referral to Behavioral Health          Diagnoses         Codes Comments    Anxiety    -  Primary ICD-10-CM: F41.9  ICD-9-CM: 300.00     Malaise and fatigue     ICD-10-CM: R53.81, R53.83  ICD-9-CM: 780.79     Concentration deficit     ICD-10-CM: R41.840  ICD-9-CM: 799.51              New Medications Ordered This Visit   Medications    buPROPion (WELLBUTRIN) 75 MG tablet     Sig: Take 1 tablet by mouth Daily.     Dispense:  90 tablet     Refill:  3     Current Outpatient Medications on File Prior to Visit   Medication Sig Dispense Refill    hydrOXYzine pamoate (Vistaril) 25 MG capsule 1-2 tabs at night 60 capsule 11    [DISCONTINUED] phentermine (ADIPEX-P) 37.5 MG tablet Take 1 tablet by mouth Every Morning Before Breakfast. 30 tablet 0     No current facility-administered medications on file prior to visit.       15 minutes   Follow Up   Return in about 6 months (around 1/24/2024) for Next scheduled follow up.   Answers submitted by the patient for this visit:  Other (Submitted on 7/18/2023)  Please describe your symptoms.: follow up/ anxiety  Have you had these symptoms before?: Yes  How long have you been having these symptoms?: Greater than 2 weeks  Please list any medications you are currently taking for this  condition.: n/a  Primary Reason for Visit (Submitted on 7/18/2023)  What is the primary reason for your visit?: Other    Refer to mental health as directed for concentration concerns -concern for add   Add wellbutrin as directed